# Patient Record
Sex: FEMALE | Race: WHITE | NOT HISPANIC OR LATINO | Employment: UNEMPLOYED | URBAN - METROPOLITAN AREA
[De-identification: names, ages, dates, MRNs, and addresses within clinical notes are randomized per-mention and may not be internally consistent; named-entity substitution may affect disease eponyms.]

---

## 2020-07-12 ENCOUNTER — HOSPITAL ENCOUNTER (INPATIENT)
Facility: OTHER | Age: 30
LOS: 1 days | Discharge: LEFT BEFORE DISCHARGE | DRG: 831 | End: 2020-07-13
Attending: EMERGENCY MEDICINE | Admitting: OBSTETRICS & GYNECOLOGY
Payer: MEDICAID

## 2020-07-12 DIAGNOSIS — A41.9 SEPSIS, DUE TO UNSPECIFIED ORGANISM, UNSPECIFIED WHETHER ACUTE ORGAN DYSFUNCTION PRESENT: ICD-10-CM

## 2020-07-12 DIAGNOSIS — F19.10 POLYSUBSTANCE ABUSE: ICD-10-CM

## 2020-07-12 DIAGNOSIS — O23.00 PYELONEPHRITIS AFFECTING PREGNANCY, ANTEPARTUM: Primary | ICD-10-CM

## 2020-07-12 PROBLEM — O09.30 LIMITED PRENATAL CARE: Status: ACTIVE | Noted: 2020-07-12

## 2020-07-12 PROBLEM — B18.2 CHRONIC HEPATITIS C VIRUS INFECTION: Status: ACTIVE | Noted: 2020-07-12

## 2020-07-12 PROBLEM — Z72.0 TOBACCO USE: Status: ACTIVE | Noted: 2020-07-12

## 2020-07-12 LAB
ALBUMIN SERPL BCP-MCNC: 3.1 G/DL (ref 3.5–5.2)
ALP SERPL-CCNC: 64 U/L (ref 55–135)
ALT SERPL W/O P-5'-P-CCNC: 32 U/L (ref 10–44)
ANION GAP SERPL CALC-SCNC: 14 MMOL/L (ref 8–16)
AST SERPL-CCNC: 23 U/L (ref 10–40)
B-HCG UR QL: POSITIVE
BACTERIA #/AREA URNS HPF: ABNORMAL /HPF
BACTERIA GENITAL QL WET PREP: ABNORMAL
BASOPHILS # BLD AUTO: 0.06 K/UL (ref 0–0.2)
BASOPHILS NFR BLD: 0.4 % (ref 0–1.9)
BILIRUB SERPL-MCNC: 0.5 MG/DL (ref 0.1–1)
BILIRUB UR QL STRIP: NEGATIVE
BUN SERPL-MCNC: 8 MG/DL (ref 6–20)
CALCIUM SERPL-MCNC: 9.2 MG/DL (ref 8.7–10.5)
CHLORIDE SERPL-SCNC: 99 MMOL/L (ref 95–110)
CLARITY UR: ABNORMAL
CLUE CELLS VAG QL WET PREP: ABNORMAL
CO2 SERPL-SCNC: 25 MMOL/L (ref 23–29)
COLOR UR: YELLOW
CREAT SERPL-MCNC: 0.8 MG/DL (ref 0.5–1.4)
CTP QC/QA: YES
DIFFERENTIAL METHOD: ABNORMAL
EOSINOPHIL # BLD AUTO: 0.2 K/UL (ref 0–0.5)
EOSINOPHIL NFR BLD: 1.2 % (ref 0–8)
ERYTHROCYTE [DISTWIDTH] IN BLOOD BY AUTOMATED COUNT: 13.9 % (ref 11.5–14.5)
EST. GFR  (AFRICAN AMERICAN): >60 ML/MIN/1.73 M^2
EST. GFR  (NON AFRICAN AMERICAN): >60 ML/MIN/1.73 M^2
FILAMENT FUNGI VAG WET PREP-#/AREA: ABNORMAL
GLUCOSE SERPL-MCNC: 109 MG/DL (ref 70–110)
GLUCOSE UR QL STRIP: NEGATIVE
HCG INTACT+B SERPL-ACNC: NORMAL MIU/ML
HCT VFR BLD AUTO: 38.3 % (ref 37–48.5)
HGB BLD-MCNC: 13.3 G/DL (ref 12–16)
HGB UR QL STRIP: ABNORMAL
HYALINE CASTS #/AREA URNS LPF: 0 /LPF
IMM GRANULOCYTES # BLD AUTO: 0.08 K/UL (ref 0–0.04)
IMM GRANULOCYTES NFR BLD AUTO: 0.5 % (ref 0–0.5)
KETONES UR QL STRIP: NEGATIVE
LACTATE SERPL-SCNC: 2.1 MMOL/L (ref 0.5–2.2)
LEUKOCYTE ESTERASE UR QL STRIP: ABNORMAL
LYMPHOCYTES # BLD AUTO: 0.6 K/UL (ref 1–4.8)
LYMPHOCYTES NFR BLD: 3.9 % (ref 18–48)
MCH RBC QN AUTO: 31.4 PG (ref 27–31)
MCHC RBC AUTO-ENTMCNC: 34.7 G/DL (ref 32–36)
MCV RBC AUTO: 90 FL (ref 82–98)
MICROSCOPIC COMMENT: ABNORMAL
MONOCYTES # BLD AUTO: 0.2 K/UL (ref 0.3–1)
MONOCYTES NFR BLD: 1.1 % (ref 4–15)
NEUTROPHILS # BLD AUTO: 14.4 K/UL (ref 1.8–7.7)
NEUTROPHILS NFR BLD: 92.9 % (ref 38–73)
NITRITE UR QL STRIP: POSITIVE
NRBC BLD-RTO: 0 /100 WBC
PH UR STRIP: 7 [PH] (ref 5–8)
PLATELET # BLD AUTO: 245 K/UL (ref 150–350)
PMV BLD AUTO: 9 FL (ref 9.2–12.9)
POTASSIUM SERPL-SCNC: 3.7 MMOL/L (ref 3.5–5.1)
PROT SERPL-MCNC: 7 G/DL (ref 6–8.4)
PROT UR QL STRIP: ABNORMAL
RBC # BLD AUTO: 4.24 M/UL (ref 4–5.4)
RBC #/AREA URNS HPF: 50 /HPF (ref 0–4)
SARS-COV-2 RDRP RESP QL NAA+PROBE: NEGATIVE
SODIUM SERPL-SCNC: 138 MMOL/L (ref 136–145)
SP GR UR STRIP: 1.01 (ref 1–1.03)
SPECIMEN SOURCE: ABNORMAL
SQUAMOUS #/AREA URNS HPF: 8 /HPF
T VAGINALIS GENITAL QL WET PREP: ABNORMAL
URN SPEC COLLECT METH UR: ABNORMAL
UROBILINOGEN UR STRIP-ACNC: NEGATIVE EU/DL
WBC # BLD AUTO: 15.51 K/UL (ref 3.9–12.7)
WBC #/AREA URNS HPF: >100 /HPF (ref 0–5)
WBC #/AREA VAG WET PREP: ABNORMAL
WBC CLUMPS URNS QL MICRO: ABNORMAL
YEAST GENITAL QL WET PREP: ABNORMAL

## 2020-07-12 PROCEDURE — 87522 HEPATITIS C REVRS TRNSCRPJ: CPT

## 2020-07-12 PROCEDURE — 96365 THER/PROPH/DIAG IV INF INIT: CPT

## 2020-07-12 PROCEDURE — 99499 UNLISTED E&M SERVICE: CPT | Mod: ,,, | Performed by: OBSTETRICS & GYNECOLOGY

## 2020-07-12 PROCEDURE — 99499 NO LOS: ICD-10-PCS | Mod: ,,, | Performed by: OBSTETRICS & GYNECOLOGY

## 2020-07-12 PROCEDURE — 87086 URINE CULTURE/COLONY COUNT: CPT

## 2020-07-12 PROCEDURE — 36415 COLL VENOUS BLD VENIPUNCTURE: CPT

## 2020-07-12 PROCEDURE — 80053 COMPREHEN METABOLIC PANEL: CPT

## 2020-07-12 PROCEDURE — 87040 BLOOD CULTURE FOR BACTERIA: CPT

## 2020-07-12 PROCEDURE — 83605 ASSAY OF LACTIC ACID: CPT

## 2020-07-12 PROCEDURE — 87088 URINE BACTERIA CULTURE: CPT

## 2020-07-12 PROCEDURE — 81000 URINALYSIS NONAUTO W/SCOPE: CPT

## 2020-07-12 PROCEDURE — 81025 URINE PREGNANCY TEST: CPT | Performed by: PHYSICIAN ASSISTANT

## 2020-07-12 PROCEDURE — 99285 EMERGENCY DEPT VISIT HI MDM: CPT | Mod: 25

## 2020-07-12 PROCEDURE — 84702 CHORIONIC GONADOTROPIN TEST: CPT

## 2020-07-12 PROCEDURE — 87210 SMEAR WET MOUNT SALINE/INK: CPT

## 2020-07-12 PROCEDURE — 96367 TX/PROPH/DG ADDL SEQ IV INF: CPT

## 2020-07-12 PROCEDURE — U0002 COVID-19 LAB TEST NON-CDC: HCPCS

## 2020-07-12 PROCEDURE — 63600175 PHARM REV CODE 636 W HCPCS: Performed by: PHYSICIAN ASSISTANT

## 2020-07-12 PROCEDURE — 87077 CULTURE AEROBIC IDENTIFY: CPT

## 2020-07-12 PROCEDURE — 85025 COMPLETE CBC W/AUTO DIFF WBC: CPT

## 2020-07-12 PROCEDURE — 87491 CHLMYD TRACH DNA AMP PROBE: CPT

## 2020-07-12 PROCEDURE — 11000001 HC ACUTE MED/SURG PRIVATE ROOM

## 2020-07-12 PROCEDURE — 87186 SC STD MICRODIL/AGAR DIL: CPT

## 2020-07-12 PROCEDURE — 86803 HEPATITIS C AB TEST: CPT

## 2020-07-12 PROCEDURE — 25000003 PHARM REV CODE 250: Performed by: STUDENT IN AN ORGANIZED HEALTH CARE EDUCATION/TRAINING PROGRAM

## 2020-07-12 PROCEDURE — 25000003 PHARM REV CODE 250: Performed by: PHYSICIAN ASSISTANT

## 2020-07-12 RX ORDER — DIPHENHYDRAMINE HYDROCHLORIDE 50 MG/ML
25 INJECTION INTRAMUSCULAR; INTRAVENOUS
Status: DISCONTINUED | OUTPATIENT
Start: 2020-07-12 | End: 2020-07-12

## 2020-07-12 RX ORDER — ONDANSETRON 8 MG/1
8 TABLET, ORALLY DISINTEGRATING ORAL EVERY 8 HOURS PRN
Status: DISCONTINUED | OUTPATIENT
Start: 2020-07-12 | End: 2020-07-13 | Stop reason: HOSPADM

## 2020-07-12 RX ORDER — ACETAMINOPHEN 500 MG
1000 TABLET ORAL
Status: COMPLETED | OUTPATIENT
Start: 2020-07-12 | End: 2020-07-12

## 2020-07-12 RX ORDER — AMOXICILLIN 250 MG
1 CAPSULE ORAL NIGHTLY PRN
Status: DISCONTINUED | OUTPATIENT
Start: 2020-07-12 | End: 2020-07-13 | Stop reason: HOSPADM

## 2020-07-12 RX ORDER — ACETAMINOPHEN 325 MG/1
650 TABLET ORAL EVERY 6 HOURS PRN
Status: DISCONTINUED | OUTPATIENT
Start: 2020-07-12 | End: 2020-07-13 | Stop reason: HOSPADM

## 2020-07-12 RX ORDER — SIMETHICONE 80 MG
1 TABLET,CHEWABLE ORAL EVERY 6 HOURS PRN
Status: DISCONTINUED | OUTPATIENT
Start: 2020-07-12 | End: 2020-07-13 | Stop reason: HOSPADM

## 2020-07-12 RX ORDER — DIPHENHYDRAMINE HCL 25 MG
25 CAPSULE ORAL EVERY 4 HOURS PRN
Status: DISCONTINUED | OUTPATIENT
Start: 2020-07-12 | End: 2020-07-13 | Stop reason: HOSPADM

## 2020-07-12 RX ORDER — PRENATAL WITH FERROUS FUM AND FOLIC ACID 3080; 920; 120; 400; 22; 1.84; 3; 20; 10; 1; 12; 200; 27; 25; 2 [IU]/1; [IU]/1; MG/1; [IU]/1; MG/1; MG/1; MG/1; MG/1; MG/1; MG/1; UG/1; MG/1; MG/1; MG/1; MG/1
1 TABLET ORAL DAILY
Status: DISCONTINUED | OUTPATIENT
Start: 2020-07-13 | End: 2020-07-13 | Stop reason: HOSPADM

## 2020-07-12 RX ORDER — SODIUM CHLORIDE 9 MG/ML
INJECTION, SOLUTION INTRAVENOUS CONTINUOUS
Status: DISCONTINUED | OUTPATIENT
Start: 2020-07-12 | End: 2020-07-13 | Stop reason: HOSPADM

## 2020-07-12 RX ORDER — DIPHENHYDRAMINE HYDROCHLORIDE 50 MG/ML
25 INJECTION INTRAMUSCULAR; INTRAVENOUS EVERY 4 HOURS PRN
Status: DISCONTINUED | OUTPATIENT
Start: 2020-07-12 | End: 2020-07-13 | Stop reason: HOSPADM

## 2020-07-12 RX ADMIN — ACETAMINOPHEN 1000 MG: 500 TABLET ORAL at 04:07

## 2020-07-12 RX ADMIN — SODIUM CHLORIDE: 0.9 INJECTION, SOLUTION INTRAVENOUS at 10:07

## 2020-07-12 RX ADMIN — SODIUM CHLORIDE 1000 ML: 0.9 INJECTION, SOLUTION INTRAVENOUS at 04:07

## 2020-07-12 RX ADMIN — PROMETHAZINE HYDROCHLORIDE 12.5 MG: 25 INJECTION INTRAMUSCULAR; INTRAVENOUS at 04:07

## 2020-07-12 RX ADMIN — CEFTRIAXONE 1 G: 1 INJECTION, SOLUTION INTRAVENOUS at 05:07

## 2020-07-12 RX ADMIN — SODIUM CHLORIDE 1178 ML: 0.9 INJECTION, SOLUTION INTRAVENOUS at 06:07

## 2020-07-12 NOTE — ED PROVIDER NOTES
Encounter Date: 7/12/2020       History     Chief Complaint   Patient presents with    Chest Pain     began 30 minutes ago more concentrated to the R upper anterior chest. + back and side pain. Pt states she is 4 months pregnant     Febrile 29-year-old female with past medical history of hepatitis-C, polysubstance abuse with last methamphetamine and heroin use 3 days ago presents to the ED for evaluation of generalized body aches, abdominal pain and fever.  She states symptoms began few days ago.  She does state that initially she had just left lower quadrant abdominal pain however this pain has now radiated to the left flank and right sided abdominal pain and flank.  She also reports associated generalized body aches and chills.  She does state that she was seen at another facility where they did a bedside ultrasound and confirmed IUP with estimated gestation at 12-13 weeks with fetal heart tones reported by ED physician at that time.  She states that she has had no prenatal care due to lack of insurance.  She does report some nausea however states that this has been occurring throughout pregnancy and denies any change in this.  She denies any cough, URI symptoms, hemoptysis, shortness of breath or chest pain at this time.  She denies any vaginal discharge, dysuria, hematuria or vaginal bleeding.  She has not tried any medications for symptoms.    The history is provided by the patient.     Review of patient's allergies indicates:  No Known Allergies  History reviewed. No pertinent past medical history.  No past surgical history on file.  History reviewed. No pertinent family history.  Social History     Tobacco Use    Smoking status: Not on file   Substance Use Topics    Alcohol use: Not on file    Drug use: Not on file     Review of Systems   Constitutional: Positive for chills and fever (Subjective).   HENT: Negative for sore throat.    Respiratory: Negative for cough and shortness of breath.     Cardiovascular: Negative for chest pain.   Gastrointestinal: Positive for abdominal pain and nausea. Negative for constipation, diarrhea and vomiting.   Genitourinary: Positive for flank pain. Negative for dysuria, hematuria, vaginal bleeding and vaginal discharge.   Musculoskeletal: Positive for arthralgias and back pain. Negative for neck pain and neck stiffness.   Skin: Negative for rash.   Allergic/Immunologic: Positive for immunocompromised state.   Neurological: Negative for weakness, light-headedness and headaches.   Hematological: Does not bruise/bleed easily.   Psychiatric/Behavioral: Negative for confusion.       Physical Exam     Initial Vitals [07/12/20 1533]   BP Pulse Resp Temp SpO2   125/74 (!) 131 (!) 24 (!) 101.4 °F (38.6 °C) --      MAP       --         Vitals:    07/12/20 1634   BP:    Pulse:    Resp:    Temp: (!) 101 °F (38.3 °C)       Physical Exam    Nursing note and vitals reviewed.  Constitutional: She appears well-developed and well-nourished. She is cooperative. She appears ill. She appears distressed.   HENT:   Head: Normocephalic and atraumatic.   Mouth/Throat: Mucous membranes are dry.   Eyes: Conjunctivae and lids are normal.   Neck: Neck supple. No neck rigidity.   Cardiovascular: Regular rhythm. Tachycardia present.    Pulmonary/Chest: Breath sounds normal. No respiratory distress. She has no wheezes. She has no rhonchi.   Abdominal: Soft. Normal appearance and bowel sounds are normal. There is no abdominal tenderness. There is CVA tenderness. There is no rigidity and no guarding.   Generalized tenderness however no rebound, rigidity or guarding; bilateral CVA tenderness   Musculoskeletal: Normal range of motion.   Neurological: She is alert and oriented to person, place, and time. GCS eye subscore is 4. GCS verbal subscore is 5. GCS motor subscore is 6.   Skin: Skin is warm, dry and intact. No rash noted.   Psychiatric: She has a normal mood and affect. Her speech is normal and  behavior is normal. Thought content normal.         ED Course   Procedures  Labs Reviewed   CBC W/ AUTO DIFFERENTIAL - Abnormal; Notable for the following components:       Result Value    WBC 15.51 (*)     Mean Corpuscular Hemoglobin 31.4 (*)     MPV 9.0 (*)     Gran # (ANC) 14.4 (*)     Immature Grans (Abs) 0.08 (*)     Lymph # 0.6 (*)     Mono # 0.2 (*)     Gran% 92.9 (*)     Lymph% 3.9 (*)     Mono% 1.1 (*)     All other components within normal limits   COMPREHENSIVE METABOLIC PANEL - Abnormal; Notable for the following components:    Albumin 3.1 (*)     All other components within normal limits   URINALYSIS, REFLEX TO URINE CULTURE - Abnormal; Notable for the following components:    Appearance, UA Hazy (*)     Protein, UA 1+ (*)     Occult Blood UA 2+ (*)     Nitrite, UA Positive (*)     Leukocytes, UA 2+ (*)     All other components within normal limits    Narrative:     Specimen Source->Urine   POCT URINE PREGNANCY - Abnormal; Notable for the following components:    POC Preg Test, Ur Positive (*)     All other components within normal limits   CULTURE, BLOOD   CULTURE, BLOOD   LACTIC ACID, PLASMA   URINALYSIS MICROSCOPIC    Narrative:     Specimen Source->Urine   SARS-COV-2 RNA AMPLIFICATION, QUAL          Imaging Results          US OB <14 Wks TransAbd & TransVag, Single Gestation (XPD) (In process)                X-Ray Chest AP Portable (Final result)  Result time 07/12/20 18:08:46    Final result by Alma Mitchell MD (07/12/20 18:08:46)                 Impression:      No acute intrathoracic abnormality detected.      Electronically signed by: Alma Mitchell  Date:    07/12/2020  Time:    18:08             Narrative:    EXAMINATION:  AP PORTABLE CHEST    CLINICAL HISTORY:  cough;    TECHNIQUE:  AP portable chest radiograph was submitted.    COMPARISON:  None.    FINDINGS:  AP portable chest radiograph demonstrates a cardiac silhouette within normal limits.  There is no focal consolidation,  pneumothorax, or pleural effusion.                                 Medical Decision Making:   Initial Assessment:   29-year-old female with pregnancy of estimated 10-12 weeks gestation with no prior prenatal care presents to the ED for evaluation of generalized abdominal pain that radiates to bilateral flank.  She also reports subjective fever, chills and generalized body aches.  She appears ill.  Mucous membranes are noted to be dry.  Lungs CTA; heart rate tachycardia however normal rhythm.  Generalized tenderness to palpation of the abdomen however no rebound, rigidity or guarding; bilateral CVA tenderness.  Skin free of rash, pallor and diaphoresis.   Differential Diagnosis:   Differential Diagnosis includes, but is not limited to:  Sepsis, bacteremia, UTI, pneumonia, cellulitis, abscess, indwelling line/catheter infection, cholecystitis, viral URI, gastroenteritis, viral syndrome, sinusitis, otitis media/externa, neoplasm, drug reaction, serotonin syndrome, intoxication/withdrawal syndrome.    Clinical Tests:   Lab Tests: Ordered and Reviewed  Radiological Study: Ordered and Reviewed  Medical Tests: Ordered and Reviewed  ED Management:  Patient noted to be febrile and tachycardic with increased respiratory rate.  Given these findings high concern for SIRS.  Labs including blood culture, lactic and COVID swab were obtained.  Labs notable for leukocytosis at 15 with a left shift.  UA does reveal nitrite positive UTI.  Rocephin was ordered as this is likely the etiology of infectious process.  Remaining 30 make per kg bolus was ordered.  Lactic is normal.  Remaining labs grossly unremarkable.  As she told triage some right-sided chest pain a chest x-ray was obtained however she does not endorse this to myself.  Did discuss findings with OB he will come evaluate the patient in the ED.  They did complete a pelvic exam with no reported abnormalities at this time.  They did recommend a formal ultrasound as she has not  had a dated ultrasound thus far.  I will add HCG as well and her serial lactic.  Patient does continue with some tachycardia and fever however given she is in her 1st trimester we will reassess after additional IV fluids; she continues with no hypotension.  Ob did come evaluate the patient in the ED and recommend admission for further IV hydration and evaluation.  Patient is agreeable to plan.  She is stable at time of admission.                   ED Course as of Jul 12 1806   Sun Jul 12, 2020 1802 EKG reveals sinus tachycardia at 1:23 a.m.; no STEM, no ectopy, no axial deviation    [LC]      ED Course User Index  [LC] WANDER Vargas                Clinical Impression:       ICD-10-CM ICD-9-CM   1. Pyelonephritis affecting pregnancy, antepartum  O23.00 646.63     590.80   2. Polysubstance abuse  F19.10 305.90   3. Sepsis, due to unspecified organism, unspecified whether acute organ dysfunction present  A41.9 038.9     995.91                                WANDER Vargas  07/12/20 1925

## 2020-07-12 NOTE — ED TRIAGE NOTES
Pt susana ed c/o generalized body pain. Pt states she is pregnant and not knowing how far along she was. She denies any SOB but admits to chest discomfort. She states also experiencing subject miscarriage but denies any spotting. Pt AAOx4, resp pattern even and non labored.

## 2020-07-12 NOTE — SUBJECTIVE & OBJECTIVE
OB History   No obstetric history on file.     History reviewed. No pertinent past medical history.  No past surgical history on file.    (Not in a hospital admission)      Review of patient's allergies indicates:  No Known Allergies     Family History     None        Tobacco Use    Smoking status: Not on file   Substance and Sexual Activity    Alcohol use: Not on file    Drug use: Not on file    Sexual activity: Not on file     Review of Systems   Constitutional: Positive for chills and fever. Negative for fatigue.   HENT: Negative for nasal congestion.    Eyes: Negative for visual disturbance.   Respiratory: Negative for cough and shortness of breath.    Cardiovascular: Negative for chest pain and leg swelling.   Gastrointestinal: Positive for abdominal pain. Negative for constipation, diarrhea, nausea and vomiting.   Endocrine: Negative for hot flashes.   Genitourinary: Positive for flank pain and frequency. Negative for dysuria, pelvic pain, vaginal bleeding and vaginal discharge.   Musculoskeletal: Positive for back pain.   Integumentary:  Negative for rash, breast skin changes and breast tenderness.   Neurological: Negative for headaches.   Psychiatric/Behavioral: Negative for depression.   Breast: Negative for skin changes and tenderness     Objective:     Vital Signs (Most Recent):  Temp: (!) 100.5 °F (38.1 °C) (07/12/20 1816)  Pulse: (!) 125 (07/12/20 1741)  Resp: (!) 24 (07/12/20 1533)  BP: (!) 113/59 (07/12/20 1739)  SpO2: 97 % (07/12/20 1739) Vital Signs (24h Range):  Temp:  [100.5 °F (38.1 °C)-101.4 °F (38.6 °C)] 100.5 °F (38.1 °C)  Pulse:  [125-131] 125  Resp:  [24] 24  SpO2:  [97 %] 97 %  BP: (113-125)/(59-74) 113/59     Weight: 72.6 kg (160 lb)  Body mass index is 25.82 kg/m².    No LMP recorded.    Physical Exam:   Constitutional: She is oriented to person, place, and time. She appears well-developed and well-nourished. No distress.    HENT:   Head: Normocephalic and atraumatic.     Neck:  Normal range of motion.    Cardiovascular: Normal rate.     Pulmonary/Chest: Effort normal. No respiratory distress.        Abdominal: Soft. Bowel sounds are normal. She exhibits no distension, no mass and no abdominal incision. There is abdominal tenderness ( TTP throughout lower abdomen, no rebound or guarding). No hernia.     Genitourinary:    Vagina and uterus normal.   Uterus is not enlarged and not tender. Cervix is normal. Right adnexum displays no tenderness and no fullness. Left adnexum displays no tenderness and no fullness.    Genitourinary Comments: Uterus approximately 10 week size, no CMT  SSE reveals no discharge, closed cervical os.   Normal urethra/ urethral meatus  Normal external genitalia   negative for vaginal discharge          Musculoskeletal: Normal range of motion. No tenderness or edema.       Neurological: She is alert and oriented to person, place, and time.    Skin: Skin is warm and dry.    Psychiatric: She has a normal mood and affect.       Laboratory:  CBC:   Recent Labs   Lab 07/12/20  1639   WBC 15.51*   RBC 4.24   HGB 13.3   HCT 38.3      MCV 90   MCH 31.4*   MCHC 34.7     CMP:   Recent Labs   Lab 07/12/20  1639      CALCIUM 9.2   ALBUMIN 3.1*   PROT 7.0      K 3.7   CO2 25   CL 99   BUN 8   CREATININE 0.8   ALKPHOS 64   ALT 32   AST 23   BILITOT 0.5     Recent Labs   Lab 07/12/20  1652   COLORU Yellow   SPECGRAV 1.015   PHUR 7.0   PROTEINUA 1+*   BACTERIA Many*   NITRITE Positive*   LEUKOCYTESUR 2+*   UROBILINOGEN Negative   HYALINECASTS 0     Lactate 2.1    Urine culture- pending    Blood culture- pending    Diagnostic Results:    CXR- negative    TVUS- pending

## 2020-07-12 NOTE — HPI
Mackenzie Montalvo is a 29 y.o.  at approximately 12-13weeks gestation based on ED bedside U/S with no PNC due to lack of insurance and pmh significant for hepatitis-C, polysubstance abuse with last methamphetamine and heroin use 3 days ago presents to the ED for evaluation of generalized body aches, abdominal pain and fever. She also endorses nausea, but no emesis. She states that the pain started a few days ago, and was localized in the LLQ, but now radiates to the L flank and R side. She reports body aches and chills. She does state that she was seen at another facility where they did a bedside ultrasound and confirmed IUP with estimated gestation at 12-13 weeks with fetal heart tones reported by ED physician at that time. She denies any cough, URI symptoms, hemoptysis, shortness of breath or chest pain at this time. She denies any vaginal discharge, dysuria, hematuria or vaginal bleeding.  She has not tried any medications for symptoms.    She is a current every day smoker, smokes approximately 0.5-1ppd. She denies any alcohol use. She endorses IVDU, most often methamphetamine and most recent use was approximately 3 days ago, before she found out she was pregnant. She also endorses heroine use. She denies ever using methadone or subutex.    She is living with her sister at this time, but is from [place] and is working to set up insurance both in [place] and in [place] because she is not sure where she will be living long term. She has 1 sexual partner, her boyfriend of the last 3 months, but she also endorses that she is uncertain the paternity of this baby. She states that she feels safe at home.

## 2020-07-13 ENCOUNTER — TELEPHONE (OUTPATIENT)
Dept: OBSTETRICS AND GYNECOLOGY | Facility: HOSPITAL | Age: 30
End: 2020-07-13

## 2020-07-13 VITALS
HEART RATE: 112 BPM | TEMPERATURE: 99 F | RESPIRATION RATE: 18 BRPM | WEIGHT: 160 LBS | DIASTOLIC BLOOD PRESSURE: 61 MMHG | BODY MASS INDEX: 25.71 KG/M2 | SYSTOLIC BLOOD PRESSURE: 113 MMHG | OXYGEN SATURATION: 100 % | HEIGHT: 66 IN

## 2020-07-13 DIAGNOSIS — O23.00 PYELONEPHRITIS AFFECTING PREGNANCY, ANTEPARTUM: Primary | ICD-10-CM

## 2020-07-13 LAB
ABO + RH BLD: NORMAL
BASOPHILS # BLD AUTO: 0.05 K/UL (ref 0–0.2)
BASOPHILS NFR BLD: 0.3 % (ref 0–1.9)
BLD GP AB SCN CELLS X3 SERPL QL: NORMAL
DIFFERENTIAL METHOD: ABNORMAL
EOSINOPHIL # BLD AUTO: 0.2 K/UL (ref 0–0.5)
EOSINOPHIL NFR BLD: 1.1 % (ref 0–8)
ERYTHROCYTE [DISTWIDTH] IN BLOOD BY AUTOMATED COUNT: 14.1 % (ref 11.5–14.5)
HBV SURFACE AG SERPL QL IA: NEGATIVE
HCT VFR BLD AUTO: 34.3 % (ref 37–48.5)
HCV AB SERPL QL IA: POSITIVE
HGB BLD-MCNC: 11.5 G/DL (ref 12–16)
HIV 1+2 AB+HIV1 P24 AG SERPL QL IA: NEGATIVE
IMM GRANULOCYTES # BLD AUTO: 0.07 K/UL (ref 0–0.04)
IMM GRANULOCYTES NFR BLD AUTO: 0.4 % (ref 0–0.5)
LACTATE SERPL-SCNC: 0.9 MMOL/L (ref 0.5–2.2)
LYMPHOCYTES # BLD AUTO: 1.1 K/UL (ref 1–4.8)
LYMPHOCYTES NFR BLD: 6.6 % (ref 18–48)
MCH RBC QN AUTO: 30.6 PG (ref 27–31)
MCHC RBC AUTO-ENTMCNC: 33.5 G/DL (ref 32–36)
MCV RBC AUTO: 91 FL (ref 82–98)
MONOCYTES # BLD AUTO: 1.2 K/UL (ref 0.3–1)
MONOCYTES NFR BLD: 6.9 % (ref 4–15)
NEUTROPHILS # BLD AUTO: 14.3 K/UL (ref 1.8–7.7)
NEUTROPHILS NFR BLD: 84.7 % (ref 38–73)
NRBC BLD-RTO: 0 /100 WBC
PLATELET # BLD AUTO: 230 K/UL (ref 150–350)
PMV BLD AUTO: 9.3 FL (ref 9.2–12.9)
RBC # BLD AUTO: 3.76 M/UL (ref 4–5.4)
RPR SER QL: NORMAL
WBC # BLD AUTO: 16.9 K/UL (ref 3.9–12.7)

## 2020-07-13 PROCEDURE — 86850 RBC ANTIBODY SCREEN: CPT

## 2020-07-13 PROCEDURE — 83605 ASSAY OF LACTIC ACID: CPT

## 2020-07-13 PROCEDURE — 86703 HIV-1/HIV-2 1 RESULT ANTBDY: CPT

## 2020-07-13 PROCEDURE — 85025 COMPLETE CBC W/AUTO DIFF WBC: CPT

## 2020-07-13 PROCEDURE — 86592 SYPHILIS TEST NON-TREP QUAL: CPT

## 2020-07-13 PROCEDURE — 25000003 PHARM REV CODE 250: Performed by: STUDENT IN AN ORGANIZED HEALTH CARE EDUCATION/TRAINING PROGRAM

## 2020-07-13 PROCEDURE — 86762 RUBELLA ANTIBODY: CPT

## 2020-07-13 PROCEDURE — 36415 COLL VENOUS BLD VENIPUNCTURE: CPT

## 2020-07-13 PROCEDURE — 87340 HEPATITIS B SURFACE AG IA: CPT

## 2020-07-13 RX ORDER — SULFAMETHOXAZOLE AND TRIMETHOPRIM 800; 160 MG/1; MG/1
1 TABLET ORAL 2 TIMES DAILY
Qty: 14 TABLET | Refills: 0 | Status: SHIPPED | OUTPATIENT
Start: 2020-07-13 | End: 2020-07-20

## 2020-07-13 RX ADMIN — SODIUM CHLORIDE: 0.9 INJECTION, SOLUTION INTRAVENOUS at 05:07

## 2020-07-13 NOTE — PLAN OF CARE
Problem: Adult Inpatient Plan of Care  Goal: Plan of Care Review  Outcome: Ongoing, Progressing  Flowsheets (Taken 2020 0523)  Plan of Care Reviewed With: patient  Goal: Readiness for Transition of Care  Outcome: Ongoing, Progressing     Problem:  Fall Injury Risk  Goal: Absence of Fall, Infant Drop and Related Injury  Outcome: Ongoing, Progressing     Pt AAOx4.  C/o pain back pain. Ambulates with steady gait.  Continuous IV fluids to left hand.  Skin intact.  Refused 1st set of labs once she arrived on the floor; agreed to get the labs drawn later at 3:50a,m.  Safety reinforced; no injuries during shift. Vital signs stable.  Will continue to monitor.

## 2020-07-13 NOTE — ASSESSMENT & PLAN NOTE
- Patient known IVDU, current user of methamphetamine and also endorses using heroine, last use 7/9  - Has never been on any form of methadone or subutex  - No current s/s of withdrawal  - May order telepsych consult due to drug abuse and need for possible addiction medicine follow up  - Social work consult placed

## 2020-07-13 NOTE — TELEPHONE ENCOUNTER
Pt left AMA per nurse. IV was taken out prior to her departure. Patient was admitted for pyelonephritis and continues to need IV antibiotics. Called patient, with no answer, and left messages encouraging her to return to the hospital for medical care and IV antibiotics. If unable to come in, to please call back and allow us to send in a prescription for oral antibiotics. Stated she would need close follow up with an OB provider during her pregnancy.     Patient has three contacts listed in her demographics: herself, Sigifredo Barbosa and Elizabeth Barnes. The patient two phone numbers (home and mobile) were the same number and same number listed under Elizabeth. This number was called and left message.  The number for sigifredo barbosa was also called and no answer and voicemail set up.     Will continue to try to contact patient.    Will send message to [facility] for follow up appointment to be scheduled to see if they can also get in contact with her.    Sent a prescription to Bactrim to [facility]. Told patient on voicemail we would do this if she is able to pick them up. She didn't have another pharmacy listed.     Kris Cordova MD  OBGYN PGY-2

## 2020-07-13 NOTE — PROGRESS NOTES
[facility]  Obstetrics  Antepartum Progress Note    Patient Name: Mackenzie Montalvo  MRN: [id number]  Admission Date: 2020  Hospital Length of Stay: 1 days  Attending Physician: No att. providers found  Primary Care Provider: Primary Doctor No    Subjective:     Principal Problem:Pyelonephritis affecting pregnancy, antepartum    HPI:  Mackenzie Montalvo is a 29 y.o.  at approximately 12-13weeks gestation based on ED bedside U/S with no PNC due to lack of insurance and pmh significant for hepatitis-C, polysubstance abuse with last methamphetamine and heroin use 3 days ago presents to the ED for evaluation of generalized body aches, abdominal pain and fever. She also endorses nausea, but no emesis. She states that the pain started a few days ago, and was localized in the LLQ, but now radiates to the L flank and R side. She reports body aches and chills. She does state that she was seen at another facility where they did a bedside ultrasound and confirmed IUP with estimated gestation at 12-13 weeks with fetal heart tones reported by ED physician at that time. She denies any cough, URI symptoms, hemoptysis, shortness of breath or chest pain at this time. She denies any vaginal discharge, dysuria, hematuria or vaginal bleeding.  She has not tried any medications for symptoms.    She is a current every day smoker, smokes approximately 0.5-1ppd. She denies any alcohol use. She endorses IVDU, most often methamphetamine and most recent use was approximately 3 days ago, before she found out she was pregnant. She also endorses heroine use. She denies ever using methadone or subutex.    She is living with her sister at this time, but is from [place] and is working to set up insurance both in [place] and in [place] because she is not sure where she will be living long term. She has 1 sexual partner, her boyfriend of the last 3 months, but she also endorses that she is uncertain the paternity of this baby. She states that  she feels safe at home.     Hospital Course:  2020 Patient with continued complaint of right flank pain radiating to groin. Denies dysuria, hematuria, fever, chills. Reports chronic constipation, last BM yesterday, no flatus since admit. No signs or symptoms of withdrawal, reports last use of heroine and methamphetamines as 4 days ago. 1st trimester labs, HCV quant, urine culture, blood culture x2 pending. WBC 15>17. Rocephin started on .         OB History    Para Term  AB Living   1 0 0 0 0 0   SAB TAB Ectopic Multiple Live Births   0 0 0 0 0      # Outcome Date GA Lbr Alan/2nd Weight Sex Delivery Anes PTL Lv   1 Current              History reviewed. No pertinent past medical history.  No past surgical history on file.    No medications prior to admission.       Review of patient's allergies indicates:  No Known Allergies     Family History     None        Tobacco Use    Smoking status: Never Smoker    Smokeless tobacco: Never Used   Substance and Sexual Activity    Alcohol use: Not Currently    Drug use: Yes     Types: IV    Sexual activity: Yes     Partners: Male     Review of Systems   Constitutional: Negative for chills, fatigue and fever.   HENT: Negative for nasal congestion.    Eyes: Negative for visual disturbance.   Respiratory: Negative for cough and shortness of breath.    Cardiovascular: Negative for chest pain and leg swelling.   Gastrointestinal: Positive for abdominal pain and constipation. Negative for diarrhea, nausea and vomiting.   Endocrine: Negative for hot flashes.   Genitourinary: Positive for flank pain. Negative for dysuria, frequency, pelvic pain, vaginal bleeding and vaginal discharge.   Musculoskeletal: Positive for back pain.   Integumentary:  Negative for rash, breast skin changes and breast tenderness.   Neurological: Negative for headaches.   Psychiatric/Behavioral: Negative for depression.   Breast: Negative for skin changes and tenderness      Objective:     Vital Signs (Most Recent):  Temp: 98.7 °F (37.1 °C) (07/13/20 0442)  Pulse: (!) 112 (07/13/20 0442)  Resp: 18 (07/13/20 0442)  BP: 113/61 (07/13/20 0442)  SpO2: 100 % (07/13/20 0442) Vital Signs (24h Range):  Temp:  [97.5 °F (36.4 °C)-101.4 °F (38.6 °C)] 98.7 °F (37.1 °C)  Pulse:  [107-131] 112  Resp:  [17-24] 18  SpO2:  [96 %-100 %] 100 %  BP: ()/(55-74) 113/61     Weight: 72.6 kg (160 lb)  Body mass index is 25.82 kg/m².    No LMP recorded (lmp unknown). Patient is pregnant.    Physical Exam:   Constitutional: She is oriented to person, place, and time. She appears well-developed and well-nourished. No distress.    HENT:   Head: Normocephalic and atraumatic.     Neck: Normal range of motion.    Cardiovascular: Normal rate.     Pulmonary/Chest: Effort normal. No respiratory distress.        Abdominal: Soft. Bowel sounds are normal. She exhibits no distension, no mass and no abdominal incision. There is abdominal tenderness ( TTP throughout lower abdomen, no rebound or guarding). There is no rebound and no guarding. No hernia.     Genitourinary:    Genitourinary Comments: CVA tenderness R>L             Musculoskeletal: Normal range of motion. No tenderness or edema.       Neurological: She is alert and oriented to person, place, and time.    Skin: Skin is warm and dry.    Psychiatric: She has a normal mood and affect.       Laboratory:  CBC:   Recent Labs   Lab 07/13/20  0412   WBC 16.90*   RBC 3.76*   HGB 11.5*   HCT 34.3*      MCV 91   MCH 30.6   MCHC 33.5     CMP:   Recent Labs   Lab 07/12/20  1639      CALCIUM 9.2   ALBUMIN 3.1*   PROT 7.0      K 3.7   CO2 25   CL 99   BUN 8   CREATININE 0.8   ALKPHOS 64   ALT 32   AST 23   BILITOT 0.5     Recent Labs   Lab 07/12/20  1652   COLORU Yellow   SPECGRAV 1.015   PHUR 7.0   PROTEINUA 1+*   BACTERIA Many*   NITRITE Positive*   LEUKOCYTESUR 2+*   UROBILINOGEN Negative   HYALINECASTS 0     Lactate 2.1    Urine culture -  pending    Blood culture - pending, no growth to date x1 day    Diagnostic Results:    CXR - negative    TVUS - Single living intrauterine gestation, crown-rump length correlates with an estimated gestational age of 13 weeks 0 days, cardiac activity documented at 172 beats per minute.  There is thin fluid in the cervical canal.    Assessment/Plan:     29 y.o. female  at Unknown for:    * Pyelonephritis affecting pregnancy, antepartum  - VS afebrile since 1816, tachycardic to 110s, normotensive, O2 sats wnl  - COVID negative  - PE with + CVA tenderness, R>L  - Labs:        CBC: WBC 15>16.9 H/H 11.5/34.3 Platelets 230       CMP wnl AST/ALT 23/32       Lactate 2.1       UA: + Nitrite, 2+ leukocyte, 1+ protein, + blood       Urine Cx : pending       Blood Cx : pending, NGTD x1 day       GCCT and vaginosis screen: pending    - s/p 2L NS in ED, and 1g tylenol  - IV Rocephin 1g/50mL q24 hours   - PRN phenergan/zofran for nausea  - Cont to monitor temp and pain closely      Chronic hepatitis C virus infection  - Patient states that she was diagnosed 2 years ago, and has not followed up with a primary doctor for this since  - uncertain whether she received treatment for this  - HCV ab and PCR drawn on admission, pending    Tobacco use  - patient is a current every day smoker  - denies wanting to quit at this time and denies tobacco patch    IV drug abuse  - Patient known IVDU, current user of methamphetamine and also endorses using heroine, last use   - Has never been on any form of methadone or subutex  - No current s/s of withdrawal  - May order telepsych consult due to drug abuse and need for possible addiction medicine follow up  - Social work consult placed    Limited prenatal care  - TVUS with single living intrauterine gestation at 13w1d  - Patient is from [place], and is attempting to file for insurance. Living with sister currently, and uncertain whether she will be staying in [place] or going back to [place]  for long term PNC.   - 1st trimester labs pending  - [facility]          Bridgett Cordova MD  Obstetrics  [facility]

## 2020-07-13 NOTE — ASSESSMENT & PLAN NOTE
- patient is a current every day smoker  - denies wanting to quit at this time and denies tobacco patch

## 2020-07-13 NOTE — ASSESSMENT & PLAN NOTE
- Patient known IVDU- current user of methamphetamine and also endorses using heroine  - Has never been on any form of methadone or subutex  - no current s/s of withdrawal  - May order telepsych consult due to drug abuse and need for possible addiction medicine follow up

## 2020-07-13 NOTE — PLAN OF CARE
0750  Patient found in [place] with belongings and dressed, intercepted per Charge RNADILENE. Statied she is leaving to go to the car get her shoes, and then states to smoke.  Policy reviewed with patient and visitor.  Patient requests AMA papers. IV removed Security called and AMA papers received.     Patient and visitor escorted out by Security s IV access c all belgings    Charge Notified Resident on call, MD Dr Yahaira Owen, Rounding Attending Notified per RN

## 2020-07-13 NOTE — ASSESSMENT & PLAN NOTE
- pt states that she was diagnosed 2 years ago, and has not followed up with a primary doctor for this since  - uncertain whether she received treatment for this  - hep C ab and PCR drawn on admission

## 2020-07-13 NOTE — ASSESSMENT & PLAN NOTE
- Patient with pregnancy of unknown gestational age- dated approximately 12-13 weeks 3 days ago with bedside ED ultrasound. Patient is from [place], and is attempting to file for insurance. Living with sister currently, and uncertain whether she will be staying in [place] or going back to [place] for long term PNC.   - TVUS ordered from ED for official dating.   - Will order PNV  - [facility] to establish PNC.

## 2020-07-13 NOTE — DISCHARGE SUMMARY
[facility]  Obstetrics  Discharge Summary      Patient Name: Mackenzie Montalvo  MRN: [id number]  Admission Date: 2020  Hospital Length of Stay: 1 days  Discharge Date and Time:  2020 10:44 AM  Attending Physician: No att. providers found   Discharging Provider: Bridgett Cordova MD   Primary Care Provider: Primary Doctor Bela    HPI: Mackenzie Montalvo is a 29 y.o.  at approximately 12-13weeks gestation based on ED bedside U/S with no PNC due to lack of insurance and pmh significant for hepatitis-C, polysubstance abuse with last methamphetamine and heroin use 3 days ago presents to the ED for evaluation of generalized body aches, abdominal pain and fever. She also endorses nausea, but no emesis. She states that the pain started a few days ago, and was localized in the LLQ, but now radiates to the L flank and R side. She reports body aches and chills. She does state that she was seen at another facility where they did a bedside ultrasound and confirmed IUP with estimated gestation at 12-13 weeks with fetal heart tones reported by ED physician at that time. She denies any cough, URI symptoms, hemoptysis, shortness of breath or chest pain at this time. She denies any vaginal discharge, dysuria, hematuria or vaginal bleeding.  She has not tried any medications for symptoms.    She is a current every day smoker, smokes approximately 0.5-1ppd. She denies any alcohol use. She endorses IVDU, most often methamphetamine and most recent use was approximately 3 days ago, before she found out she was pregnant. She also endorses heroine use. She denies ever using methadone or subutex.    She is living with her sister at this time, but is from [place] and is working to set up insurance both in [place] and in [place] because she is not sure where she will be living long term. She has 1 sexual partner, her boyfriend of the last 3 months, but she also endorses that she is uncertain the paternity of this baby. She states  that she feels safe at home.     Hospital Course:   07/13/2020 Patient with continued complaint of right flank pain radiating to groin. Denies dysuria, hematuria, fever, chills. Reports chronic constipation, last BM yesterday, no flatus since admit. No signs or symptoms of withdrawal, reports last use of heroine and methamphetamines as 4 days ago. 1st trimester labs, HCV quant, urine culture, blood culture x2 pending. WBC 15>17. Rocephin started on 7/12.          Final Active Diagnoses:    Diagnosis Date Noted POA    PRINCIPAL PROBLEM:  Pyelonephritis affecting pregnancy, antepartum [O23.00] 07/12/2020 Yes    Limited prenatal care [O09.30] 07/12/2020 Not Applicable    IV drug abuse [F19.10] 07/12/2020 Unknown    Tobacco use [Z72.0] 07/12/2020 Unknown    Chronic hepatitis C virus infection [B18.2] 07/12/2020 Unknown      Problems Resolved During this Admission:        Significant Diagnostic Studies: Labs:   BMP:   Recent Labs   Lab 07/12/20  1639         K 3.7   CL 99   CO2 25   BUN 8   CREATININE 0.8   CALCIUM 9.2   , CBC   Recent Labs   Lab 07/12/20  1639 07/13/20  0412   WBC 15.51* 16.90*   HGB 13.3 11.5*   HCT 38.3 34.3*    230   , All labs within the past 24 hours have been reviewed.  Radiology: Ultrasound: Single living intrauterine gestation, crown-rump length correlates with an estimated gestational age of 13 weeks 0 days, cardiac activity documented at 172 beats per minute.  There is thin fluid in the cervical canal.    Immunizations     None          This patient has no babies on file.  Pending Diagnostic Studies:     Procedure Component Value Units Date/Time    HIV 1/2 Ag/Ab (4th Gen) [[id number]] Collected: 07/13/20 0412    Order Status: Sent Lab Status: In process Updated: 07/13/20 0939    Specimen: Blood     Hepatitis B surface antigen [[id number]] Collected: 07/13/20 0413    Order Status: Sent Lab Status: In process Updated: 07/13/20 0939    Specimen: Blood     Hepatitis C RNA,  quantitative, PCR [[id number]] Collected: 07/12/20 1940    Order Status: Sent Lab Status: In process Updated: 07/13/20 0819    Specimen: Blood     Narrative:      Collection has been rescheduled by SACHIN at 07/12/2020 19:43 Reason:   Duplicate order per nurse Stefan. JOSHJoni    RPR [[id number]] Collected: 07/13/20 0412    Order Status: Sent Lab Status: In process Updated: 07/13/20 0536    Specimen: Blood     Rubella antibody, IgG [[id number]] Collected: 07/13/20 0412    Order Status: Sent Lab Status: In process Updated: 07/13/20 0939    Specimen: Blood           Discharged Condition: against medical advice    Disposition: Home or Self Care    Follow Up:    Patient Instructions:   No discharge procedures on file. as patient left AMA and we were unable to see her prior to her departure.  Medications:  There are no discharge medications for this patient.  No medications were ordered prior to discharge. Patient was called after we learned she had left AMA and asked to return for inpatient treatment for her pyelonephritis with IV abx. If patient unable to return, and no pharmacy on her records, a prescription for PO abx was sent to [facility] in case patient listens to voicemail and comes to  the medication.    Kris Cordova MD  OBGYN PGY-2

## 2020-07-13 NOTE — ASSESSMENT & PLAN NOTE
- TVUS with single living intrauterine gestation at 13w1d  - [place] is from [place], and is attempting to file for insurance. Living with sister currently, and uncertain whether she will be staying in [place] or going back to [place] for long term PNC.   - 1st trimester labs pending  - [facility]

## 2020-07-13 NOTE — PLAN OF CARE
Pt left AMA prior to initial or final discharge planning assessment completed.   07/13/20 0951   Final Note   Assessment Type Final Discharge Note

## 2020-07-13 NOTE — ASSESSMENT & PLAN NOTE
- Patient states that she was diagnosed 2 years ago, and has not followed up with a primary doctor for this since  - uncertain whether she received treatment for this  - HCV ab and PCR drawn on admission, pending

## 2020-07-13 NOTE — CONSULTS
[facility]  Obstetrics & Gynecology  Consult Note    Patient Name: Mackenzie Montalvo  MRN: [id number]  Admission Date: 2020  Hospital Length of Stay: 0 days  Code Status: Full Code  Primary Care Provider: Primary Doctor No  Principal Problem: Pyelonephritis affecting pregnancy, antepartum    Consults  Subjective:     Chief Complaint: body aches, fever, abdominal pain    History of Present Illness:  Mackenzie Montalvo is a 29 y.o.  at approximately 12-13weeks gestation based on ED bedside U/S with no PNC due to lack of insurance and pmh significant for hepatitis-C, polysubstance abuse with last methamphetamine and heroin use 3 days ago presents to the ED for evaluation of generalized body aches, abdominal pain and fever. She also endorses nausea, but no emesis. She states that the pain started a few days ago, and was localized in the LLQ, but now radiates to the L flank and R side. She reports body aches and chills. She does state that she was seen at another facility where they did a bedside ultrasound and confirmed IUP with estimated gestation at 12-13 weeks with fetal heart tones reported by ED physician at that time. She denies any cough, URI symptoms, hemoptysis, shortness of breath or chest pain at this time. She denies any vaginal discharge, dysuria, hematuria or vaginal bleeding.  She has not tried any medications for symptoms.    She is a current every day smoker, smokes approximately 0.5-1ppd. She denies any alcohol use. She endorses IVDU, most often methamphetamine and most recent use was approximately 3 days ago, before she found out she was pregnant. She also endorses heroine use. She denies ever using methadone or subutex.    She is living with her sister at this time, but is from [place] and is working to set up insurance both in [place] and in [place] because she is not sure where she will be living long term. She has 1 sexual partner, her boyfriend of the last 3 months, but she also endorses  that she is uncertain the paternity of this baby. She states that she feels safe at home.         OB History   No obstetric history on file.     History reviewed. No pertinent past medical history.  No past surgical history on file.    (Not in a hospital admission)      Review of patient's allergies indicates:  No Known Allergies     Family History     None        Tobacco Use    Smoking status: Not on file   Substance and Sexual Activity    Alcohol use: Not on file    Drug use: Not on file    Sexual activity: Not on file     Review of Systems   Constitutional: Positive for chills and fever. Negative for fatigue.   HENT: Negative for nasal congestion.    Eyes: Negative for visual disturbance.   Respiratory: Negative for cough and shortness of breath.    Cardiovascular: Negative for chest pain and leg swelling.   Gastrointestinal: Positive for abdominal pain. Negative for constipation, diarrhea, nausea and vomiting.   Endocrine: Negative for hot flashes.   Genitourinary: Positive for flank pain and frequency. Negative for dysuria, pelvic pain, vaginal bleeding and vaginal discharge.   Musculoskeletal: Positive for back pain.   Integumentary:  Negative for rash, breast skin changes and breast tenderness.   Neurological: Negative for headaches.   Psychiatric/Behavioral: Negative for depression.   Breast: Negative for skin changes and tenderness     Objective:     Vital Signs (Most Recent):  Temp: (!) 100.5 °F (38.1 °C) (07/12/20 1816)  Pulse: (!) 125 (07/12/20 1741)  Resp: (!) 24 (07/12/20 1533)  BP: (!) 113/59 (07/12/20 1739)  SpO2: 97 % (07/12/20 1739) Vital Signs (24h Range):  Temp:  [100.5 °F (38.1 °C)-101.4 °F (38.6 °C)] 100.5 °F (38.1 °C)  Pulse:  [125-131] 125  Resp:  [24] 24  SpO2:  [97 %] 97 %  BP: (113-125)/(59-74) 113/59     Weight: 72.6 kg (160 lb)  Body mass index is 25.82 kg/m².    No LMP recorded.    Physical Exam:   Constitutional: She is oriented to person, place, and time. She appears  well-developed and well-nourished. No distress.    HENT:   Head: Normocephalic and atraumatic.     Neck: Normal range of motion.    Cardiovascular: Normal rate.     Pulmonary/Chest: Effort normal. No respiratory distress.        Abdominal: Soft. Bowel sounds are normal. She exhibits no distension, no mass and no abdominal incision. There is abdominal tenderness ( TTP throughout lower abdomen, no rebound or guarding). No hernia.     Genitourinary:    Vagina and uterus normal.   Uterus is not enlarged and not tender. Cervix is normal. Right adnexum displays no tenderness and no fullness. Left adnexum displays no tenderness and no fullness.    Genitourinary Comments: Uterus approximately 10 week size, no CMT  SSE reveals no discharge, closed cervical os.   Normal urethra/ urethral meatus  Normal external genitalia   negative for vaginal discharge          Musculoskeletal: Normal range of motion. No tenderness or edema.       Neurological: She is alert and oriented to person, place, and time.    Skin: Skin is warm and dry.    Psychiatric: She has a normal mood and affect.       Laboratory:  CBC:   Recent Labs   Lab 07/12/20  1639   WBC 15.51*   RBC 4.24   HGB 13.3   HCT 38.3      MCV 90   MCH 31.4*   MCHC 34.7     CMP:   Recent Labs   Lab 07/12/20  1639      CALCIUM 9.2   ALBUMIN 3.1*   PROT 7.0      K 3.7   CO2 25   CL 99   BUN 8   CREATININE 0.8   ALKPHOS 64   ALT 32   AST 23   BILITOT 0.5     Recent Labs   Lab 07/12/20  1652   COLORU Yellow   SPECGRAV 1.015   PHUR 7.0   PROTEINUA 1+*   BACTERIA Many*   NITRITE Positive*   LEUKOCYTESUR 2+*   UROBILINOGEN Negative   HYALINECASTS 0     Lactate 2.1    Urine culture- pending    Blood culture- pending    Diagnostic Results:    CXR- negative    TVUS- pending    Assessment/Plan:     * Pyelonephritis affecting pregnancy, antepartum  - VS with 101.4 temp on admission, tachycardic to 120s, normotensive and )2 sats wnl  - Covid negative  - PE with + CVA  tenderness, R>L, no vaginal discharge, no CMT  - Labs:        CBC: 15.5/13/38/245       CMP wnl       Lactate 2.1       UA: + Nitrite, 2+ leukocyte, 1+ protein, + blood       Urine Cx 7/12: pending       Blood Cx 7/12: pending       GCCT and vaginosis screen: pending    - s/p 2L NS in ED, and 1g tylenol  - IV Rocephin 1g/50mL q24 hours   - PRN phenergan/ zofran for nausea  - Cont to monitor temp and pain closely      Chronic hepatitis C virus infection  - pt states that she was diagnosed 2 years ago, and has not followed up with a primary doctor for this since  - uncertain whether she received treatment for this  - hep C ab and PCR drawn on admission    Tobacco use  - patient is a current every day smoker  - denies wanting to quit at this time and denies tobacco patch    IV drug abuse  - Patient known IVDU- current user of methamphetamine and also endorses using heroine  - Has never been on any form of methadone or subutex  - no current s/s of withdrawal  - May order telepsych consult due to drug abuse and need for possible addiction medicine follow up    Limited prenatal care  - Patient with pregnancy of unknown gestational age- dated approximately 12-13 weeks 3 days ago with bedside ED ultrasound. [place] is from [place], and is attempting to file for insurance. Living with sister currently, and uncertain whether she will be staying in [place] or going back to [place] for long term PNC.   - TVUS ordered from ED for official dating.   - Will order PNV  - [facility] to [facility] PNC.       Thank you for your consult. I will follow-up with patient. Please contact us if you have any additional questions.    Elizabeth Booth MD  Obstetrics & Gynecology  [facility] Attending:   Plan of care and pt history reviewed with the resident. Unfortunately, the patient left AMA on am of 7/13/20 and I was not able to examine her or personally interview her.  No E/M bill was  sumitted.   Edita Atwood MD

## 2020-07-13 NOTE — ASSESSMENT & PLAN NOTE
- VS with 101.4 temp on admission, tachycardic to 120s, normotensive and )2 sats wnl  - Covid negative  - PE with + CVA tenderness, R>L, no vaginal discharge, no CMT  - Labs:        CBC: 15.5/13/38/245       CMP wnl       Lactate 2.1       UA: + Nitrite, 2+ leukocyte, 1+ protein, + blood       Urine Cx 7/12: pending       Blood Cx 7/12: pending       GCCT and vaginosis screen: pending    - s/p 2L NS in ED, and 1g tylenol  - IV Rocephin 1g/50mL q24 hours   - PRN phenergan/ zofran for nausea  - Cont to monitor temp and pain closely

## 2020-07-13 NOTE — ASSESSMENT & PLAN NOTE
- VS afebrile since 7/12 1816, tachycardic to 110s, normotensive, O2 sats wnl  - COVID negative  - PE with + CVA tenderness, R>L  - Labs:        CBC: WBC 15>16.9 H/H 11.5/34.3 Platelets 230       CMP wnl AST/ALT 23/32       Lactate 2.1       UA: + Nitrite, 2+ leukocyte, 1+ protein, + blood       Urine Cx 7/12: pending       Blood Cx 7/12: pending, NGTD x1 day       GCCT and vaginosis screen: pending    - s/p 2L NS in ED, and 1g tylenol  - IV Rocephin 1g/50mL q24 hours   - PRN phenergan/zofran for nausea  - Cont to monitor temp and pain closely

## 2020-07-13 NOTE — HOSPITAL COURSE
07/13/2020 Patient with continued complaint of right flank pain radiating to groin. Denies dysuria, hematuria, fever, chills. Reports chronic constipation, last BM yesterday, no flatus since admit. No signs or symptoms of withdrawal, reports last use of heroine and methamphetamines as 4 days ago. 1st trimester labs, HCV quant, urine culture, blood culture x2 pending. WBC 15>17. Rocephin started on 7/12.

## 2020-07-13 NOTE — SUBJECTIVE & OBJECTIVE
OB History    Para Term  AB Living   1 0 0 0 0 0   SAB TAB Ectopic Multiple Live Births   0 0 0 0 0      # Outcome Date GA Lbr Alan/2nd Weight Sex Delivery Anes PTL Lv   1 Current              History reviewed. No pertinent past medical history.  No past surgical history on file.    No medications prior to admission.       Review of patient's allergies indicates:  No Known Allergies     Family History     None        Tobacco Use    Smoking status: Never Smoker    Smokeless tobacco: Never Used   Substance and Sexual Activity    Alcohol use: Not Currently    Drug use: Yes     Types: IV    Sexual activity: Yes     Partners: Male     Review of Systems   Constitutional: Negative for chills, fatigue and fever.   HENT: Negative for nasal congestion.    Eyes: Negative for visual disturbance.   Respiratory: Negative for cough and shortness of breath.    Cardiovascular: Negative for chest pain and leg swelling.   Gastrointestinal: Positive for abdominal pain and constipation. Negative for diarrhea, nausea and vomiting.   Endocrine: Negative for hot flashes.   Genitourinary: Positive for flank pain. Negative for dysuria, frequency, pelvic pain, vaginal bleeding and vaginal discharge.   Musculoskeletal: Positive for back pain.   Integumentary:  Negative for rash, breast skin changes and breast tenderness.   Neurological: Negative for headaches.   Psychiatric/Behavioral: Negative for depression.   Breast: Negative for skin changes and tenderness     Objective:     Vital Signs (Most Recent):  Temp: 98.7 °F (37.1 °C) (20 044)  Pulse: (!) 112 (20 044)  Resp: 18 (20 044)  BP: 113/61 (20)  SpO2: 100 % (20) Vital Signs (24h Range):  Temp:  [97.5 °F (36.4 °C)-101.4 °F (38.6 °C)] 98.7 °F (37.1 °C)  Pulse:  [107-131] 112  Resp:  [17-24] 18  SpO2:  [96 %-100 %] 100 %  BP: ()/(55-74) 113/61     Weight: 72.6 kg (160 lb)  Body mass index is 25.82 kg/m².    No LMP recorded  (lmp unknown). Patient is pregnant.    Physical Exam:   Constitutional: She is oriented to person, place, and time. She appears well-developed and well-nourished. No distress.    HENT:   Head: Normocephalic and atraumatic.     Neck: Normal range of motion.    Cardiovascular: Normal rate.     Pulmonary/Chest: Effort normal. No respiratory distress.        Abdominal: Soft. Bowel sounds are normal. She exhibits no distension, no mass and no abdominal incision. There is abdominal tenderness ( TTP throughout lower abdomen, no rebound or guarding). There is no rebound and no guarding. No hernia.     Genitourinary:    Genitourinary Comments: CVA tenderness R>L             Musculoskeletal: Normal range of motion. No tenderness or edema.       Neurological: She is alert and oriented to person, place, and time.    Skin: Skin is warm and dry.    Psychiatric: She has a normal mood and affect.       Laboratory:  CBC:   Recent Labs   Lab 07/13/20  0412   WBC 16.90*   RBC 3.76*   HGB 11.5*   HCT 34.3*      MCV 91   MCH 30.6   MCHC 33.5     CMP:   Recent Labs   Lab 07/12/20  1639      CALCIUM 9.2   ALBUMIN 3.1*   PROT 7.0      K 3.7   CO2 25   CL 99   BUN 8   CREATININE 0.8   ALKPHOS 64   ALT 32   AST 23   BILITOT 0.5     Recent Labs   Lab 07/12/20  1652   COLORU Yellow   SPECGRAV 1.015   PHUR 7.0   PROTEINUA 1+*   BACTERIA Many*   NITRITE Positive*   LEUKOCYTESUR 2+*   UROBILINOGEN Negative   HYALINECASTS 0     Lactate 2.1    Urine culture - pending    Blood culture - pending, no growth to date x1 day    Diagnostic Results:    CXR - negative    TVUS - Single living intrauterine gestation, crown-rump length correlates with an estimated gestational age of 13 weeks 0 days, cardiac activity documented at 172 beats per minute.  There is thin fluid in the cervical canal.

## 2020-07-14 LAB
BACTERIA UR CULT: ABNORMAL
RUBV IGG SER-ACNC: 15.9 IU/ML
RUBV IGG SER-IMP: REACTIVE

## 2020-07-15 LAB
C TRACH DNA SPEC QL NAA+PROBE: NOT DETECTED
N GONORRHOEA DNA SPEC QL NAA+PROBE: NOT DETECTED

## 2020-07-16 LAB
HCV RNA SERPL NAA+PROBE-LOG IU: 5.57 LOG (10) IU/ML
HCV RNA SERPL QL NAA+PROBE: DETECTED IU/ML
HCV RNA SPEC NAA+PROBE-ACNC: ABNORMAL IU/ML

## 2020-07-17 LAB — BACTERIA BLD CULT: NORMAL

## 2020-07-18 LAB — BACTERIA BLD CULT: NORMAL

## 2023-08-15 ENCOUNTER — OFFICE VISIT (OUTPATIENT)
Dept: OBSTETRICS AND GYNECOLOGY | Facility: CLINIC | Age: 33
End: 2023-08-15
Payer: MEDICAID

## 2023-08-15 ENCOUNTER — LAB VISIT (OUTPATIENT)
Dept: LAB | Facility: HOSPITAL | Age: 33
End: 2023-08-15
Payer: MEDICAID

## 2023-08-15 VITALS
DIASTOLIC BLOOD PRESSURE: 76 MMHG | WEIGHT: 162.56 LBS | BODY MASS INDEX: 26.13 KG/M2 | HEIGHT: 66 IN | SYSTOLIC BLOOD PRESSURE: 128 MMHG

## 2023-08-15 DIAGNOSIS — N91.2 AMENORRHEA: Primary | ICD-10-CM

## 2023-08-15 DIAGNOSIS — Z86.19 HISTORY OF HEPATITIS C: ICD-10-CM

## 2023-08-15 DIAGNOSIS — Z32.01 PREGNANCY CONFIRMED BY POSITIVE URINE TEST: ICD-10-CM

## 2023-08-15 LAB
ABO + RH BLD: NORMAL
ANION GAP SERPL CALC-SCNC: 11 MMOL/L (ref 8–16)
B-HCG UR QL: POSITIVE
BASOPHILS # BLD AUTO: 0.08 K/UL (ref 0–0.2)
BASOPHILS NFR BLD: 0.7 % (ref 0–1.9)
BLD GP AB SCN CELLS X3 SERPL QL: NORMAL
BUN SERPL-MCNC: 15 MG/DL (ref 6–20)
CALCIUM SERPL-MCNC: 9.7 MG/DL (ref 8.7–10.5)
CHLORIDE SERPL-SCNC: 105 MMOL/L (ref 95–110)
CO2 SERPL-SCNC: 25 MMOL/L (ref 23–29)
CREAT SERPL-MCNC: 0.8 MG/DL (ref 0.5–1.4)
CTP QC/QA: YES
DIFFERENTIAL METHOD: ABNORMAL
EOSINOPHIL # BLD AUTO: 0.6 K/UL (ref 0–0.5)
EOSINOPHIL NFR BLD: 5.1 % (ref 0–8)
ERYTHROCYTE [DISTWIDTH] IN BLOOD BY AUTOMATED COUNT: 12 % (ref 11.5–14.5)
EST. GFR  (NO RACE VARIABLE): >60 ML/MIN/1.73 M^2
GLUCOSE SERPL-MCNC: 93 MG/DL (ref 70–110)
HCT VFR BLD AUTO: 38.1 % (ref 37–48.5)
HGB BLD-MCNC: 13.2 G/DL (ref 12–16)
IMM GRANULOCYTES # BLD AUTO: 0.06 K/UL (ref 0–0.04)
IMM GRANULOCYTES NFR BLD AUTO: 0.5 % (ref 0–0.5)
LYMPHOCYTES # BLD AUTO: 1.7 K/UL (ref 1–4.8)
LYMPHOCYTES NFR BLD: 15.3 % (ref 18–48)
MCH RBC QN AUTO: 33.8 PG (ref 27–31)
MCHC RBC AUTO-ENTMCNC: 34.6 G/DL (ref 32–36)
MCV RBC AUTO: 97 FL (ref 82–98)
MONOCYTES # BLD AUTO: 0.5 K/UL (ref 0.3–1)
MONOCYTES NFR BLD: 4.7 % (ref 4–15)
NEUTROPHILS # BLD AUTO: 8.1 K/UL (ref 1.8–7.7)
NEUTROPHILS NFR BLD: 73.7 % (ref 38–73)
NRBC BLD-RTO: 0 /100 WBC
PLATELET # BLD AUTO: 316 K/UL (ref 150–450)
PMV BLD AUTO: 8.8 FL (ref 9.2–12.9)
POTASSIUM SERPL-SCNC: 3.7 MMOL/L (ref 3.5–5.1)
RBC # BLD AUTO: 3.91 M/UL (ref 4–5.4)
SODIUM SERPL-SCNC: 141 MMOL/L (ref 136–145)
SPECIMEN OUTDATE: NORMAL
TSH SERPL DL<=0.005 MIU/L-ACNC: 1.02 UIU/ML (ref 0.4–4)
WBC # BLD AUTO: 10.93 K/UL (ref 3.9–12.7)

## 2023-08-15 PROCEDURE — 87491 CHLMYD TRACH DNA AMP PROBE: CPT

## 2023-08-15 PROCEDURE — 86592 SYPHILIS TEST NON-TREP QUAL: CPT

## 2023-08-15 PROCEDURE — 1159F MED LIST DOCD IN RCRD: CPT | Mod: CPTII,,,

## 2023-08-15 PROCEDURE — 87077 CULTURE AEROBIC IDENTIFY: CPT

## 2023-08-15 PROCEDURE — 3078F PR MOST RECENT DIASTOLIC BLOOD PRESSURE < 80 MM HG: ICD-10-PCS | Mod: CPTII,,,

## 2023-08-15 PROCEDURE — 99204 PR OFFICE/OUTPT VISIT, NEW, LEVL IV, 45-59 MIN: ICD-10-PCS | Mod: S$PBB,TH,,

## 2023-08-15 PROCEDURE — 88175 CYTOPATH C/V AUTO FLUID REDO: CPT

## 2023-08-15 PROCEDURE — 86803 HEPATITIS C AB TEST: CPT

## 2023-08-15 PROCEDURE — 3008F PR BODY MASS INDEX (BMI) DOCUMENTED: ICD-10-PCS | Mod: CPTII,,,

## 2023-08-15 PROCEDURE — 86762 RUBELLA ANTIBODY: CPT

## 2023-08-15 PROCEDURE — 81025 URINE PREGNANCY TEST: CPT | Mod: PBBFAC

## 2023-08-15 PROCEDURE — 87088 URINE BACTERIA CULTURE: CPT

## 2023-08-15 PROCEDURE — 3008F BODY MASS INDEX DOCD: CPT | Mod: CPTII,,,

## 2023-08-15 PROCEDURE — 3074F PR MOST RECENT SYSTOLIC BLOOD PRESSURE < 130 MM HG: ICD-10-PCS | Mod: CPTII,,,

## 2023-08-15 PROCEDURE — 99203 OFFICE O/P NEW LOW 30 MIN: CPT | Mod: PBBFAC,TH

## 2023-08-15 PROCEDURE — 99999PBSHW POCT URINE PREGNANCY: ICD-10-PCS | Mod: PBBFAC,,,

## 2023-08-15 PROCEDURE — 87340 HEPATITIS B SURFACE AG IA: CPT

## 2023-08-15 PROCEDURE — 87624 HPV HI-RISK TYP POOLED RSLT: CPT | Mod: 59

## 2023-08-15 PROCEDURE — 99999 PR PBB SHADOW E&M-NEW PATIENT-LVL III: ICD-10-PCS | Mod: PBBFAC,,,

## 2023-08-15 PROCEDURE — 99999PBSHW POCT URINE PREGNANCY: Mod: PBBFAC,,,

## 2023-08-15 PROCEDURE — 81220 CFTR GENE COM VARIANTS: CPT

## 2023-08-15 PROCEDURE — 87389 HIV-1 AG W/HIV-1&-2 AB AG IA: CPT

## 2023-08-15 PROCEDURE — 84443 ASSAY THYROID STIM HORMONE: CPT

## 2023-08-15 PROCEDURE — 86900 BLOOD TYPING SEROLOGIC ABO: CPT

## 2023-08-15 PROCEDURE — 87086 URINE CULTURE/COLONY COUNT: CPT

## 2023-08-15 PROCEDURE — 99999 PR PBB SHADOW E&M-NEW PATIENT-LVL III: CPT | Mod: PBBFAC,,,

## 2023-08-15 PROCEDURE — 1159F PR MEDICATION LIST DOCUMENTED IN MEDICAL RECORD: ICD-10-PCS | Mod: CPTII,,,

## 2023-08-15 PROCEDURE — 85025 COMPLETE CBC W/AUTO DIFF WBC: CPT

## 2023-08-15 PROCEDURE — 87186 SC STD MICRODIL/AGAR DIL: CPT

## 2023-08-15 PROCEDURE — 81514 NFCT DS BV&VAGINITIS DNA ALG: CPT

## 2023-08-15 PROCEDURE — 3078F DIAST BP <80 MM HG: CPT | Mod: CPTII,,,

## 2023-08-15 PROCEDURE — 3074F SYST BP LT 130 MM HG: CPT | Mod: CPTII,,,

## 2023-08-15 PROCEDURE — 99204 OFFICE O/P NEW MOD 45 MIN: CPT | Mod: S$PBB,TH,,

## 2023-08-15 PROCEDURE — 80048 BASIC METABOLIC PNL TOTAL CA: CPT

## 2023-08-15 PROCEDURE — 87591 N.GONORRHOEAE DNA AMP PROB: CPT

## 2023-08-15 RX ORDER — ONDANSETRON 4 MG/1
4 TABLET, ORALLY DISINTEGRATING ORAL EVERY 6 HOURS PRN
Qty: 60 TABLET | Refills: 2 | Status: SHIPPED | OUTPATIENT
Start: 2023-08-15 | End: 2023-08-15

## 2023-08-15 RX ORDER — PRENATAL WITH FERROUS FUM AND FOLIC ACID 3080; 920; 120; 400; 22; 1.84; 3; 20; 10; 1; 12; 200; 27; 25; 2 [IU]/1; [IU]/1; MG/1; [IU]/1; MG/1; MG/1; MG/1; MG/1; MG/1; MG/1; UG/1; MG/1; MG/1; MG/1; MG/1
1 TABLET ORAL DAILY
Qty: 30 TABLET | Refills: 11 | Status: SHIPPED | OUTPATIENT
Start: 2023-08-15 | End: 2024-08-14

## 2023-08-15 RX ORDER — ONDANSETRON 4 MG/1
4 TABLET, ORALLY DISINTEGRATING ORAL EVERY 6 HOURS PRN
Qty: 60 TABLET | Refills: 2 | Status: ON HOLD | OUTPATIENT
Start: 2023-08-15 | End: 2024-02-26 | Stop reason: HOSPADM

## 2023-08-15 RX ORDER — PRENATAL WITH FERROUS FUM AND FOLIC ACID 3080; 920; 120; 400; 22; 1.84; 3; 20; 10; 1; 12; 200; 27; 25; 2 [IU]/1; [IU]/1; MG/1; [IU]/1; MG/1; MG/1; MG/1; MG/1; MG/1; MG/1; UG/1; MG/1; MG/1; MG/1; MG/1
1 TABLET ORAL DAILY
Qty: 30 TABLET | Refills: 11 | Status: SHIPPED | OUTPATIENT
Start: 2023-08-15 | End: 2023-08-15

## 2023-08-15 NOTE — PROGRESS NOTES
CC: Positive Pregnancy Test    HISTORY OF PRESENT ILLNESS:    Mackenzie Montalvo is a 32 y.o. female, ,  Presents today for a routine exam complaining of oligomenorrhea and positive home urine pregnancy test.  Patient's last menstrual period was 2023 (approximate).   She is not currently on any contraception. UPT is Positive.  Pregnancy is desired. Sexual Activity: single partner, contraception: none. Last period was normal.  She is not taking a PNV.  She reports nausea/vomiting. Current symptoms also include: morning sickness, nausea, and positive home pregnancy test. Denies vaginal bleeding and pelvic pain.    Denies abdominal surgeries, medications, genetic family history (SC/CF). No personal or family history of DM. No personal or family history of thyroid disease. No h/o HSV. She has a history of IV drug use and Chronic Hepatitis C. Feels safe at home.     OB history:     Prior pregnancies 1  1   Complications: none     Last Pap: does not recall    ROS:  GENERAL: No weight changes. No swelling. No fatigue. No fever.  CARDIOVASCULAR: No chest pain. No shortness of breath. No leg cramps.   NEUROLOGICAL: No headaches. No vision changes.  BREASTS: No pain. No lumps. No discharge.  ABDOMEN: No pain. No diarrhea. No constipation.  REPRODUCTIVE: No abnormal bleeding.   VULVA: No pain. No lesions. No itching.  VAGINA: No relaxation. No itching. No odor. No discharge. No lesions.  URINARY: No incontinence. No nocturia. No frequency. No dysuria.    MEDICATIONS AND ALLERGIES:  Reviewed    COMPREHENSIVE GYN HISTORY:  PAP History: Denies abnormal Paps.  Infection History: Denies STDs. Denies PID.  Benign History: Denies uterine fibroids. Denies ovarian cysts. Denies endometriosis. Denies other conditions.  Cancer History: Denies cervical cancer. Denies uterine cancer or hyperplasia. Denies ovarian cancer. Denies vulvar cancer or pre-cancer. Denies vaginal cancer or pre-cancer. Denies breast cancer. Denies  "colon cancer.  Sexual Activity History: Reports currently being sexually active  Menstrual History: None.  Contraception: None    /76 (BP Location: Left arm, Patient Position: Sitting)   Ht 5' 6" (1.676 m)   Wt 73.8 kg (162 lb 9.4 oz)   LMP 2023 (Approximate)   Breastfeeding No   BMI 26.24 kg/m²     PE:  AFFECT: Calm, alert and oriented X 3. Interactive during exam  GENERAL: Appears well-nourished, well-developed, in no acute distress.  HEAD: Normocephalic, atruamatic  TEETH: Good dentition.  THYROID: No thyromegally   BREASTS: No masses, skin changes, nipple discharge or adenopathy bilaterally.  SKIN: Normal for race, warm, & dry. No lesions or rashes.  LUNGS: Easy and unlabored, clear to auscultation bilaterally.  HEART: Regular rate and rhythm   ABDOMEN: Soft and nontender without masses or organomegally.  VULVA: No lesions, masses or tenderness.  VAGINA: Moist and well rugated without lesions or discharge.  CERVIX: Moist and pink without lesions, discharge or tenderness.      UTERUS SIZE: 12 week size, nontender and without masses.  ADNEXA: No masses or tenderness.  ESTIMATE OF PELVIC CAPACITY: Adequate  EXTREMITIES: No cyanosis, clubbing or edema. No calf tenderness.  LYMPH NODES: No axillary or inguinal adenopathy.    PROCEDURES:  UPT Positive  Affirm  Pap    FHTs 160s    ASSESSMENT/PLAN:  Amenorrhea  Positive urine pregnancy test (KAMI: 2024, EGA: 12w 6d based on LMP:2023)    -  Routine prenatal care    Nausea and vomiting in pregnancy    -  Education regarding lifestyle and dietary modifications    -  Advised use of B6/Unisom. Pt will notify us if no relief/worsening symptoms, will consider alternative therapies prn    1st TRIMESTER COUNSELING: Discussed all, booklet provided:  Common complaints of pregnancy  HIV and other routine prenatal tests including  genetic screening  Risk factors identified by prenatal history  Oriented to practice - discussed anticipated course of " prenatal care & indications for Ultrasound  Childbirth classes/Hospital facilities   Nutrition and weight gain counseling  -- Discussed IOM recommended weight gain of:          Underweight        Less than 18.5            28-40            Normal Weight     18.5-24.9                    25-35            Overweight          25-29.9                       15-25            Obese                  30 and greater             11-20    -- Discussed criteria for delivery at [facility] r/t excessive pre-preg weight or excessive weight gain:          Pre-pregnancy BMI over 40 or excess pregnancy weight gain defined as:          Pre-preg BMI < 18.5; Excess weight gain = > 60 pound          Pre-preg BMI 18.5-24.9;  Excess weight gain = > 53 pounds          Pre-preg BMI 25-29.9;  Excess weight gain = > 38 pounds          Pre-preg BMI > 30;  Excess weight gain = > 30 pounds  Toxoplasmosis precautions (Cats/Raw Meat)  Sexual activity and exercise  Environmental/Work hazards  Travel  Tobacco (Ask, Advise, Assess, Assist, and Arrange), as well as alcohol and drug use  Use of any medications (Including supplements, Vitamins, Herbs, or OTC Drugs)  Domestic violence  Seat belt use      Dating US ordered  Indications for low-dose aspirin use after 12 weeks for the prevention of pre-eclampsia reviewed.  For this patient, her high risk factors include None. Her moderate risk factors include:  Sociodemographic characteristics: low socioeconomic status.  Patient is not a candidate for low-dose aspirin.  Anatomy US 19-20 weeks   Routine serum and urine prenatal labs today    1. Amenorrhea  - POCT urine pregnancy    2. Pregnancy confirmed by positive urine test  - US OB/GYN Procedure ([facility]) - Extended List; Future  - HIV 1/2 Ag/Ab (4th Gen); Future  - RPR; Future  - Hepatitis B surface antigen; Future  - Type & Screen; Future  - Rubella antibody, IgG; Future  - Urine culture  - CBC auto differential; Future  - Basic metabolic panel; Future  - TSH;  Future  - C. trachomatis/N. gonorrhoeae by AMP DNA [facility]; Urine  - Cystic Fibrosis Mutation Panel; Future  - Vaginosis Screen by DNA Probe  - ondansetron (ZOFRAN-ODT) 4 MG TbDL; dissolve 1 tablet (4 mg total) by mouth every 6 (six) hours as needed (nausea/vomiting).  Dispense: 60 tablet; Refill: 2  - PNV,calcium 72/iron/folic acid (PRENATAL VITAMIN) Tab; Take 1 tablet by mouth once daily.  Dispense: 30 tablet; Refill: 11  - Liquid-Based Pap Smear, Screening  - HPV High Risk Genotypes, PCR    3. History of hepatitis C  - Hepatitis C Antibody; Future  - Ambulatory referral/consult to Perinatology; Future      FOLLOW-UP in 4 weeks with Dr. Krystyna Esteban, NP-C    OB/GYN

## 2023-08-16 ENCOUNTER — TELEPHONE (OUTPATIENT)
Dept: MATERNAL FETAL MEDICINE | Facility: CLINIC | Age: 33
End: 2023-08-16
Payer: MEDICAID

## 2023-08-16 LAB
C TRACH DNA SPEC QL NAA+PROBE: NOT DETECTED
HBV SURFACE AG SERPL QL IA: NORMAL
HCV AB SERPL QL IA: REACTIVE
HIV 1+2 AB+HIV1 P24 AG SERPL QL IA: NORMAL
N GONORRHOEA DNA SPEC QL NAA+PROBE: NOT DETECTED

## 2023-08-16 NOTE — TELEPHONE ENCOUNTER
Call to patient and it states that this number is disconnected or no longer in service. No other numbers listed for patient in system.

## 2023-08-17 ENCOUNTER — TELEPHONE (OUTPATIENT)
Dept: MATERNAL FETAL MEDICINE | Facility: CLINIC | Age: 33
End: 2023-08-17
Payer: MEDICAID

## 2023-08-17 LAB
BACTERIA UR CULT: ABNORMAL
RPR SER QL: NORMAL
RUBV IGG SER-ACNC: 18.7 IU/ML
RUBV IGG SER-IMP: REACTIVE

## 2023-08-17 NOTE — TELEPHONE ENCOUNTER
Attempted to contact patient and it stated this number is no longer in service. Attempted to contact emergency contact. No answer and did not leave a voicemail.

## 2023-08-18 ENCOUNTER — TELEPHONE (OUTPATIENT)
Dept: OBSTETRICS AND GYNECOLOGY | Facility: CLINIC | Age: 33
End: 2023-08-18
Payer: MEDICAID

## 2023-08-18 ENCOUNTER — TELEPHONE (OUTPATIENT)
Dept: PAIN MEDICINE | Facility: CLINIC | Age: 33
End: 2023-08-18
Payer: MEDICAID

## 2023-08-18 DIAGNOSIS — O23.41 URINARY TRACT INFECTION IN MOTHER DURING FIRST TRIMESTER OF PREGNANCY: ICD-10-CM

## 2023-08-18 DIAGNOSIS — O23.41 URINARY TRACT INFECTION IN MOTHER DURING FIRST TRIMESTER OF PREGNANCY: Primary | ICD-10-CM

## 2023-08-18 DIAGNOSIS — Z86.19 HISTORY OF HEPATITIS C: Primary | ICD-10-CM

## 2023-08-18 LAB
BACTERIAL VAGINOSIS DNA: NEGATIVE
CANDIDA GLABRATA DNA: NEGATIVE
CANDIDA KRUSEI DNA: NEGATIVE
CANDIDA RRNA VAG QL PROBE: POSITIVE
CFTR MUT ANL BLD/T: NORMAL
T VAGINALIS RRNA GENITAL QL PROBE: NEGATIVE

## 2023-08-18 RX ORDER — CEPHALEXIN 500 MG/1
500 CAPSULE ORAL EVERY 6 HOURS
Qty: 28 CAPSULE | Refills: 0 | Status: SHIPPED | OUTPATIENT
Start: 2023-08-18 | End: 2023-08-18

## 2023-08-18 RX ORDER — CEPHALEXIN 500 MG/1
500 CAPSULE ORAL EVERY 6 HOURS
Qty: 28 CAPSULE | Refills: 0 | Status: SHIPPED | OUTPATIENT
Start: 2023-08-18 | End: 2023-08-25

## 2023-08-18 NOTE — TELEPHONE ENCOUNTER
Tried to call pt and number has been changed.         ----- Message from Trae Esteban NP sent at 8/18/2023  9:05 AM CDT -----  Can you also let her know:    Your blood shows hepatitis C antibody positive. I will run one more test to check this. We want to be sure that you are not currently infected.  Please head to the lab to have your blood drawn again.      Make sure she follows up with her PCP as well.     Let us know if you have any questions  SHIRIN Figueroa

## 2023-08-18 NOTE — TELEPHONE ENCOUNTER
----- Message from Trae Esteban NP sent at 8/18/2023  8:54 AM CDT -----  Please let patient know the following:     Your urine culture shows that you did have Klebsiella Pneumoniae which is a type of bacteria in your urine. It is sensitive to  Keflex which is the medication that I just sent to your pharmacy. Therefore, the medication, if taking correctly,should clear this up.  This is also safe in pregnancy.     Medication sent to:  [facility] - [facility] - [address] [facility]  [address] [place] [address]  Phone: [phone] Fax: [phone]

## 2023-08-21 ENCOUNTER — TELEPHONE (OUTPATIENT)
Dept: OBSTETRICS AND GYNECOLOGY | Facility: CLINIC | Age: 33
End: 2023-08-21
Payer: MEDICAID

## 2023-08-21 DIAGNOSIS — B37.9 YEAST DETECTED: Primary | ICD-10-CM

## 2023-08-21 RX ORDER — TERCONAZOLE 4 MG/G
1 CREAM VAGINAL NIGHTLY
Qty: 45 G | Refills: 0 | Status: SHIPPED | OUTPATIENT
Start: 2023-08-21 | End: 2023-08-28

## 2023-08-22 LAB
HPV HR 12 DNA SPEC QL NAA+PROBE: NEGATIVE
HPV16 AG SPEC QL: NEGATIVE
HPV18 DNA SPEC QL NAA+PROBE: NEGATIVE

## 2023-08-23 LAB
FINAL PATHOLOGIC DIAGNOSIS: NORMAL
Lab: NORMAL

## 2023-09-25 ENCOUNTER — INITIAL PRENATAL (OUTPATIENT)
Dept: OBSTETRICS AND GYNECOLOGY | Facility: CLINIC | Age: 33
End: 2023-09-25
Payer: MEDICAID

## 2023-09-25 ENCOUNTER — LAB VISIT (OUTPATIENT)
Dept: LAB | Facility: HOSPITAL | Age: 33
End: 2023-09-25
Attending: OBSTETRICS & GYNECOLOGY
Payer: MEDICAID

## 2023-09-25 VITALS
BODY MASS INDEX: 26.58 KG/M2 | SYSTOLIC BLOOD PRESSURE: 132 MMHG | WEIGHT: 164.69 LBS | DIASTOLIC BLOOD PRESSURE: 82 MMHG

## 2023-09-25 DIAGNOSIS — O99.312 ALCOHOL USE AFFECTING PREGNANCY IN SECOND TRIMESTER: ICD-10-CM

## 2023-09-25 DIAGNOSIS — Z3A.18 18 WEEKS GESTATION OF PREGNANCY: ICD-10-CM

## 2023-09-25 DIAGNOSIS — B18.2 CHRONIC HEPATITIS C AFFECTING PREGNANCY, ANTEPARTUM: ICD-10-CM

## 2023-09-25 DIAGNOSIS — B18.2 CHRONIC HEPATITIS C AFFECTING PREGNANCY, ANTEPARTUM: Primary | ICD-10-CM

## 2023-09-25 DIAGNOSIS — O98.419 CHRONIC HEPATITIS C AFFECTING PREGNANCY, ANTEPARTUM: Primary | ICD-10-CM

## 2023-09-25 DIAGNOSIS — O98.419 CHRONIC HEPATITIS C AFFECTING PREGNANCY, ANTEPARTUM: ICD-10-CM

## 2023-09-25 LAB
ALBUMIN SERPL BCP-MCNC: 3.6 G/DL (ref 3.5–5.2)
ALP SERPL-CCNC: 60 U/L (ref 55–135)
ALT SERPL W/O P-5'-P-CCNC: 41 U/L (ref 10–44)
ANION GAP SERPL CALC-SCNC: 9 MMOL/L (ref 8–16)
AST SERPL-CCNC: 63 U/L (ref 10–40)
BILIRUB SERPL-MCNC: 0.4 MG/DL (ref 0.1–1)
BUN SERPL-MCNC: 12 MG/DL (ref 6–20)
CALCIUM SERPL-MCNC: 9.1 MG/DL (ref 8.7–10.5)
CHLORIDE SERPL-SCNC: 104 MMOL/L (ref 95–110)
CO2 SERPL-SCNC: 24 MMOL/L (ref 23–29)
CREAT SERPL-MCNC: 0.8 MG/DL (ref 0.5–1.4)
EST. GFR  (NO RACE VARIABLE): >60 ML/MIN/1.73 M^2
GLUCOSE SERPL-MCNC: 84 MG/DL (ref 70–110)
POTASSIUM SERPL-SCNC: 3.5 MMOL/L (ref 3.5–5.1)
PROT SERPL-MCNC: 7.3 G/DL (ref 6–8.4)
SODIUM SERPL-SCNC: 137 MMOL/L (ref 136–145)

## 2023-09-25 PROCEDURE — 36415 COLL VENOUS BLD VENIPUNCTURE: CPT | Performed by: OBSTETRICS & GYNECOLOGY

## 2023-09-25 PROCEDURE — 80053 COMPREHEN METABOLIC PANEL: CPT | Performed by: OBSTETRICS & GYNECOLOGY

## 2023-09-25 PROCEDURE — 99999 PR PBB SHADOW E&M-EST. PATIENT-LVL III: ICD-10-PCS | Mod: PBBFAC,,, | Performed by: OBSTETRICS & GYNECOLOGY

## 2023-09-25 PROCEDURE — 99213 OFFICE O/P EST LOW 20 MIN: CPT | Mod: PBBFAC,TH | Performed by: OBSTETRICS & GYNECOLOGY

## 2023-09-25 PROCEDURE — 99213 OFFICE O/P EST LOW 20 MIN: CPT | Mod: TH,S$PBB,, | Performed by: OBSTETRICS & GYNECOLOGY

## 2023-09-25 PROCEDURE — 99999 PR PBB SHADOW E&M-EST. PATIENT-LVL III: CPT | Mod: PBBFAC,,, | Performed by: OBSTETRICS & GYNECOLOGY

## 2023-09-25 PROCEDURE — 99213 PR OFFICE/OUTPT VISIT, EST, LEVL III, 20-29 MIN: ICD-10-PCS | Mod: TH,S$PBB,, | Performed by: OBSTETRICS & GYNECOLOGY

## 2023-09-25 NOTE — PROGRESS NOTES
18w5d. Pt reports no complaints.  Patient has hepatitis C and ETOH abuse.   MFM referral placed.  Denies contractions, vaginal bleeding, or leakage of fluid.  Reports adequate fetal movement.  Trisomy screening:declines  F/U 4 weeks. Next visit: routine care     Total time spent on visit was 20 minutes.  This includes face to face time and non-face to face time preparing to see the patient (eg, review of tests), Obtaining and/or reviewing separately obtained history, Documenting clinical information in the electronic or other health record, Independently interpreting resultsand communicating results to the patient/family/caregiver, or Care coordination.

## 2023-10-02 ENCOUNTER — TELEPHONE (OUTPATIENT)
Dept: MATERNAL FETAL MEDICINE | Facility: CLINIC | Age: 33
End: 2023-10-02
Payer: MEDICAID

## 2023-10-02 NOTE — TELEPHONE ENCOUNTER
Did not show for Maternal Fetal Medicine visits today. Did attempt to call patient. No answer and did not leave a voicemail.

## 2023-10-04 ENCOUNTER — PATIENT MESSAGE (OUTPATIENT)
Dept: OTHER | Facility: OTHER | Age: 33
End: 2023-10-04
Payer: MEDICAID

## 2023-10-10 ENCOUNTER — TELEPHONE (OUTPATIENT)
Dept: MATERNAL FETAL MEDICINE | Facility: CLINIC | Age: 33
End: 2023-10-10
Payer: MEDICAID

## 2023-10-10 NOTE — TELEPHONE ENCOUNTER
Attempted to contact patient to schedule her ultrasound and consult with M. No answer, left VM to contact office to schedule.

## 2023-10-23 ENCOUNTER — TELEPHONE (OUTPATIENT)
Dept: OBSTETRICS AND GYNECOLOGY | Facility: CLINIC | Age: 33
End: 2023-10-23
Payer: MEDICAID

## 2023-10-23 NOTE — TELEPHONE ENCOUNTER
----- Message from Joanne Jones sent at 10/23/2023  9:02 AM CDT -----  Regarding: patient call back  Type: Patient Call Back    Who called: Self     What is the request in detail: Is not going to make it on time due to traffic in [facility] and would like to know if she could get a call to come in later.     Can the clinic reply by [facility]? No     Would the patient rather a call back or a response via [facility]? Call     Best call back number: .[phone]

## 2023-11-01 ENCOUNTER — PATIENT MESSAGE (OUTPATIENT)
Dept: OTHER | Facility: OTHER | Age: 33
End: 2023-11-01
Payer: MEDICAID

## 2023-11-20 ENCOUNTER — ROUTINE PRENATAL (OUTPATIENT)
Dept: OBSTETRICS AND GYNECOLOGY | Facility: CLINIC | Age: 33
End: 2023-11-20
Payer: MEDICAID

## 2023-11-20 ENCOUNTER — TELEPHONE (OUTPATIENT)
Dept: MATERNAL FETAL MEDICINE | Facility: CLINIC | Age: 33
End: 2023-11-20
Payer: MEDICAID

## 2023-11-20 ENCOUNTER — PATIENT MESSAGE (OUTPATIENT)
Dept: MATERNAL FETAL MEDICINE | Facility: CLINIC | Age: 33
End: 2023-11-20
Payer: MEDICAID

## 2023-11-20 ENCOUNTER — LAB VISIT (OUTPATIENT)
Dept: LAB | Facility: HOSPITAL | Age: 33
End: 2023-11-20
Attending: OBSTETRICS & GYNECOLOGY
Payer: MEDICAID

## 2023-11-20 VITALS — DIASTOLIC BLOOD PRESSURE: 70 MMHG | WEIGHT: 170 LBS | SYSTOLIC BLOOD PRESSURE: 130 MMHG | BODY MASS INDEX: 27.43 KG/M2

## 2023-11-20 DIAGNOSIS — B18.2 CHRONIC HEPATITIS C AFFECTING PREGNANCY, ANTEPARTUM: Primary | ICD-10-CM

## 2023-11-20 DIAGNOSIS — O98.419 CHRONIC HEPATITIS C AFFECTING PREGNANCY, ANTEPARTUM: ICD-10-CM

## 2023-11-20 DIAGNOSIS — O98.419 CHRONIC HEPATITIS C AFFECTING PREGNANCY, ANTEPARTUM: Primary | ICD-10-CM

## 2023-11-20 DIAGNOSIS — Z3A.26 26 WEEKS GESTATION OF PREGNANCY: ICD-10-CM

## 2023-11-20 DIAGNOSIS — B18.2 CHRONIC HEPATITIS C AFFECTING PREGNANCY, ANTEPARTUM: ICD-10-CM

## 2023-11-20 LAB
ALBUMIN SERPL BCP-MCNC: 3.4 G/DL (ref 3.5–5.2)
ALP SERPL-CCNC: 91 U/L (ref 55–135)
ALT SERPL W/O P-5'-P-CCNC: 26 U/L (ref 10–44)
ANION GAP SERPL CALC-SCNC: 8 MMOL/L (ref 8–16)
AST SERPL-CCNC: 61 U/L (ref 10–40)
BASOPHILS # BLD AUTO: 0.07 K/UL (ref 0–0.2)
BASOPHILS NFR BLD: 0.7 % (ref 0–1.9)
BILIRUB SERPL-MCNC: 0.5 MG/DL (ref 0.1–1)
BUN SERPL-MCNC: 9 MG/DL (ref 6–20)
CALCIUM SERPL-MCNC: 8.6 MG/DL (ref 8.7–10.5)
CHLORIDE SERPL-SCNC: 104 MMOL/L (ref 95–110)
CO2 SERPL-SCNC: 24 MMOL/L (ref 23–29)
CREAT SERPL-MCNC: 0.7 MG/DL (ref 0.5–1.4)
DIFFERENTIAL METHOD: ABNORMAL
EOSINOPHIL # BLD AUTO: 0.2 K/UL (ref 0–0.5)
EOSINOPHIL NFR BLD: 2.1 % (ref 0–8)
ERYTHROCYTE [DISTWIDTH] IN BLOOD BY AUTOMATED COUNT: 13.1 % (ref 11.5–14.5)
EST. GFR  (NO RACE VARIABLE): >60 ML/MIN/1.73 M^2
GLUCOSE SERPL-MCNC: 119 MG/DL (ref 70–140)
GLUCOSE SERPL-MCNC: 80 MG/DL (ref 70–110)
HBV SURFACE AG SERPL QL IA: NORMAL
HCT VFR BLD AUTO: 35.2 % (ref 37–48.5)
HGB BLD-MCNC: 11.8 G/DL (ref 12–16)
HIV 1+2 AB+HIV1 P24 AG SERPL QL IA: NORMAL
IMM GRANULOCYTES # BLD AUTO: 0.06 K/UL (ref 0–0.04)
IMM GRANULOCYTES NFR BLD AUTO: 0.6 % (ref 0–0.5)
LYMPHOCYTES # BLD AUTO: 1.1 K/UL (ref 1–4.8)
LYMPHOCYTES NFR BLD: 10.4 % (ref 18–48)
MCH RBC QN AUTO: 33.6 PG (ref 27–31)
MCHC RBC AUTO-ENTMCNC: 33.5 G/DL (ref 32–36)
MCV RBC AUTO: 100 FL (ref 82–98)
MONOCYTES # BLD AUTO: 0.6 K/UL (ref 0.3–1)
MONOCYTES NFR BLD: 5.5 % (ref 4–15)
NEUTROPHILS # BLD AUTO: 8.2 K/UL (ref 1.8–7.7)
NEUTROPHILS NFR BLD: 80.7 % (ref 38–73)
NRBC BLD-RTO: 0 /100 WBC
PLATELET # BLD AUTO: 326 K/UL (ref 150–450)
PMV BLD AUTO: 8.8 FL (ref 9.2–12.9)
POTASSIUM SERPL-SCNC: 3.6 MMOL/L (ref 3.5–5.1)
PROT SERPL-MCNC: 7.6 G/DL (ref 6–8.4)
RBC # BLD AUTO: 3.51 M/UL (ref 4–5.4)
SODIUM SERPL-SCNC: 136 MMOL/L (ref 136–145)
WBC # BLD AUTO: 10.09 K/UL (ref 3.9–12.7)

## 2023-11-20 PROCEDURE — 87522 HEPATITIS C REVRS TRNSCRPJ: CPT | Performed by: OBSTETRICS & GYNECOLOGY

## 2023-11-20 PROCEDURE — 80053 COMPREHEN METABOLIC PANEL: CPT | Performed by: OBSTETRICS & GYNECOLOGY

## 2023-11-20 PROCEDURE — 86592 SYPHILIS TEST NON-TREP QUAL: CPT | Performed by: OBSTETRICS & GYNECOLOGY

## 2023-11-20 PROCEDURE — 99213 PR OFFICE/OUTPT VISIT, EST, LEVL III, 20-29 MIN: ICD-10-PCS | Mod: TH,S$PBB,, | Performed by: OBSTETRICS & GYNECOLOGY

## 2023-11-20 PROCEDURE — 87389 HIV-1 AG W/HIV-1&-2 AB AG IA: CPT | Performed by: OBSTETRICS & GYNECOLOGY

## 2023-11-20 PROCEDURE — 99213 OFFICE O/P EST LOW 20 MIN: CPT | Mod: TH,S$PBB,, | Performed by: OBSTETRICS & GYNECOLOGY

## 2023-11-20 PROCEDURE — 85025 COMPLETE CBC W/AUTO DIFF WBC: CPT | Performed by: OBSTETRICS & GYNECOLOGY

## 2023-11-20 PROCEDURE — 99211 OFF/OP EST MAY X REQ PHY/QHP: CPT | Mod: PBBFAC,TH | Performed by: OBSTETRICS & GYNECOLOGY

## 2023-11-20 PROCEDURE — 99999 PR PBB SHADOW E&M-EST. PATIENT-LVL I: CPT | Mod: PBBFAC,,, | Performed by: OBSTETRICS & GYNECOLOGY

## 2023-11-20 PROCEDURE — 82950 GLUCOSE TEST: CPT | Performed by: OBSTETRICS & GYNECOLOGY

## 2023-11-20 PROCEDURE — 87340 HEPATITIS B SURFACE AG IA: CPT | Performed by: OBSTETRICS & GYNECOLOGY

## 2023-11-20 PROCEDURE — 99999 PR PBB SHADOW E&M-EST. PATIENT-LVL I: ICD-10-PCS | Mod: PBBFAC,,, | Performed by: OBSTETRICS & GYNECOLOGY

## 2023-11-20 PROCEDURE — 36415 COLL VENOUS BLD VENIPUNCTURE: CPT | Performed by: OBSTETRICS & GYNECOLOGY

## 2023-11-20 NOTE — TELEPHONE ENCOUNTER
Call to patient to schedule her u/s and consult for Maternal Fetal Medicine. No answer. Left voicemail with callback number.

## 2023-11-20 NOTE — PROGRESS NOTES
26w5d . Pt reports no complaints.  Patient has hepatitis C and ETOH abuse.   MFM referral placed.  Denies contractions, vaginal bleeding, or leakage of fluid.  Reports adequate fetal movement.  Trisomy screening:declines  F/U 4 weeks. Next visit: routine care     Total time spent on visit was 20 minutes.  This includes face to face time and non-face to face time preparing to see the patient (eg, review of tests), Obtaining and/or reviewing separately obtained history, Documenting clinical information in the electronic or other health record, Independently interpreting resultsand communicating results to the patient/family/caregiver, or Care coordination.

## 2023-11-21 ENCOUNTER — TELEPHONE (OUTPATIENT)
Dept: MATERNAL FETAL MEDICINE | Facility: CLINIC | Age: 33
End: 2023-11-21
Payer: MEDICAID

## 2023-11-21 LAB — RPR SER QL: NORMAL

## 2023-11-21 NOTE — TELEPHONE ENCOUNTER
Attempted to contact patient to schedule her Maternal Fetal Medicine consult and ultrasound. No answer, left VM to contact office to schedule.

## 2023-11-22 ENCOUNTER — TELEPHONE (OUTPATIENT)
Dept: MATERNAL FETAL MEDICINE | Facility: CLINIC | Age: 33
End: 2023-11-22
Payer: MEDICAID

## 2023-11-22 ENCOUNTER — PATIENT MESSAGE (OUTPATIENT)
Dept: OBSTETRICS AND GYNECOLOGY | Facility: CLINIC | Age: 33
End: 2023-11-22
Payer: MEDICAID

## 2023-11-22 LAB
HCV RNA SERPL NAA+PROBE-LOG IU: 6.99 LOGIU/ML
HCV RNA SERPL QL NAA+PROBE: DETECTED
HCV RNA SPEC NAA+PROBE-ACNC: ABNORMAL IU/ML

## 2023-11-29 ENCOUNTER — PROCEDURE VISIT (OUTPATIENT)
Dept: MATERNAL FETAL MEDICINE | Facility: CLINIC | Age: 33
End: 2023-11-29
Payer: MEDICAID

## 2023-11-29 ENCOUNTER — OFFICE VISIT (OUTPATIENT)
Dept: MATERNAL FETAL MEDICINE | Facility: CLINIC | Age: 33
End: 2023-11-29
Payer: MEDICAID

## 2023-11-29 ENCOUNTER — PATIENT MESSAGE (OUTPATIENT)
Dept: PEDIATRIC CARDIOLOGY | Facility: CLINIC | Age: 33
End: 2023-11-29
Payer: MEDICAID

## 2023-11-29 ENCOUNTER — TELEPHONE (OUTPATIENT)
Dept: PEDIATRIC CARDIOLOGY | Facility: CLINIC | Age: 33
End: 2023-11-29
Payer: MEDICAID

## 2023-11-29 VITALS
DIASTOLIC BLOOD PRESSURE: 80 MMHG | HEIGHT: 66 IN | SYSTOLIC BLOOD PRESSURE: 110 MMHG | WEIGHT: 170 LBS | BODY MASS INDEX: 27.32 KG/M2

## 2023-11-29 DIAGNOSIS — O99.312 ALCOHOL USE AFFECTING PREGNANCY IN SECOND TRIMESTER: ICD-10-CM

## 2023-11-29 DIAGNOSIS — Z36.83 ENCOUNTER FOR FETAL SCREENING FOR CONGENITAL CARDIAC ABNORMALITIES: Primary | ICD-10-CM

## 2023-11-29 DIAGNOSIS — Z72.0 TOBACCO USE: ICD-10-CM

## 2023-11-29 DIAGNOSIS — Z86.19 HISTORY OF HEPATITIS C: ICD-10-CM

## 2023-11-29 DIAGNOSIS — B18.2 CHRONIC HEPATITIS C AFFECTING PREGNANCY, ANTEPARTUM: ICD-10-CM

## 2023-11-29 DIAGNOSIS — O35.8XX0 PREGNANCY COMPLICATED BY FETAL SKELETAL DYSPLASIA, SINGLE GESTATION: Primary | ICD-10-CM

## 2023-11-29 DIAGNOSIS — Z36.2 ENCOUNTER FOR FOLLOW-UP ULTRASOUND OF FETAL ANATOMY: ICD-10-CM

## 2023-11-29 DIAGNOSIS — O98.419 CHRONIC HEPATITIS C AFFECTING PREGNANCY, ANTEPARTUM: ICD-10-CM

## 2023-11-29 DIAGNOSIS — B18.2 CHRONIC HEPATITIS C WITHOUT HEPATIC COMA: ICD-10-CM

## 2023-11-29 PROBLEM — O23.00 PYELONEPHRITIS AFFECTING PREGNANCY, ANTEPARTUM: Status: RESOLVED | Noted: 2020-07-12 | Resolved: 2023-11-29

## 2023-11-29 PROCEDURE — 76811 OB US DETAILED SNGL FETUS: CPT | Mod: PBBFAC | Performed by: OBSTETRICS & GYNECOLOGY

## 2023-11-29 PROCEDURE — 99215 OFFICE O/P EST HI 40 MIN: CPT | Mod: S$PBB,TH,, | Performed by: OBSTETRICS & GYNECOLOGY

## 2023-11-29 PROCEDURE — 99999 PR PBB SHADOW E&M-EST. PATIENT-LVL III: ICD-10-PCS | Mod: PBBFAC,,, | Performed by: OBSTETRICS & GYNECOLOGY

## 2023-11-29 PROCEDURE — 1160F RVW MEDS BY RX/DR IN RCRD: CPT | Mod: CPTII,,, | Performed by: OBSTETRICS & GYNECOLOGY

## 2023-11-29 PROCEDURE — 1160F PR REVIEW ALL MEDS BY PRESCRIBER/CLIN PHARMACIST DOCUMENTED: ICD-10-PCS | Mod: CPTII,,, | Performed by: OBSTETRICS & GYNECOLOGY

## 2023-11-29 PROCEDURE — 76811 US MFM PROCEDURE (VIEWPOINT): ICD-10-PCS | Mod: 26,S$PBB,, | Performed by: OBSTETRICS & GYNECOLOGY

## 2023-11-29 PROCEDURE — 99999 PR PBB SHADOW E&M-EST. PATIENT-LVL III: CPT | Mod: PBBFAC,,, | Performed by: OBSTETRICS & GYNECOLOGY

## 2023-11-29 PROCEDURE — 99213 OFFICE O/P EST LOW 20 MIN: CPT | Mod: PBBFAC,TH | Performed by: OBSTETRICS & GYNECOLOGY

## 2023-11-29 PROCEDURE — 3079F PR MOST RECENT DIASTOLIC BLOOD PRESSURE 80-89 MM HG: ICD-10-PCS | Mod: CPTII,,, | Performed by: OBSTETRICS & GYNECOLOGY

## 2023-11-29 PROCEDURE — 3079F DIAST BP 80-89 MM HG: CPT | Mod: CPTII,,, | Performed by: OBSTETRICS & GYNECOLOGY

## 2023-11-29 PROCEDURE — 1159F MED LIST DOCD IN RCRD: CPT | Mod: CPTII,,, | Performed by: OBSTETRICS & GYNECOLOGY

## 2023-11-29 PROCEDURE — 99215 PR OFFICE/OUTPT VISIT, EST, LEVL V, 40-54 MIN: ICD-10-PCS | Mod: S$PBB,TH,, | Performed by: OBSTETRICS & GYNECOLOGY

## 2023-11-29 PROCEDURE — 1159F PR MEDICATION LIST DOCUMENTED IN MEDICAL RECORD: ICD-10-PCS | Mod: CPTII,,, | Performed by: OBSTETRICS & GYNECOLOGY

## 2023-11-29 PROCEDURE — 3074F SYST BP LT 130 MM HG: CPT | Mod: CPTII,,, | Performed by: OBSTETRICS & GYNECOLOGY

## 2023-11-29 PROCEDURE — 3074F PR MOST RECENT SYSTOLIC BLOOD PRESSURE < 130 MM HG: ICD-10-PCS | Mod: CPTII,,, | Performed by: OBSTETRICS & GYNECOLOGY

## 2023-11-29 PROCEDURE — 3008F PR BODY MASS INDEX (BMI) DOCUMENTED: ICD-10-PCS | Mod: CPTII,,, | Performed by: OBSTETRICS & GYNECOLOGY

## 2023-11-29 PROCEDURE — 99417 PROLNG OP E/M EACH 15 MIN: CPT | Mod: S$PBB,,, | Performed by: OBSTETRICS & GYNECOLOGY

## 2023-11-29 PROCEDURE — 99417 PR PROLONGED SVC, OUTPT, W/WO DIRECT PT CONTACT,  EA ADDTL 15 MIN: ICD-10-PCS | Mod: S$PBB,,, | Performed by: OBSTETRICS & GYNECOLOGY

## 2023-11-29 PROCEDURE — 3008F BODY MASS INDEX DOCD: CPT | Mod: CPTII,,, | Performed by: OBSTETRICS & GYNECOLOGY

## 2023-11-29 NOTE — ASSESSMENT & PLAN NOTE
"Fetal Skeletal Dysplasia  Today's ultrasound findings are suggestive of a fetal skeletal dysplasia with all of the long bones measuring 3-5 standard deviations below the mean, with bowed L femur and sharply bowed R tibia. A detailed fetal anatomic survey was performed suggesting kyphosis/scoliosis vs a hemivertebrae without specific additional findings. Pregnancy dating information was reviewed and confirmed; dates today are based on a 17w dating scan at [facility] retrieved from [facility].     Maternal medical and family histories were reviewed, as was paternal family history. She reports constitutional short stature on her father's side, with his mother being 4'7". She has limited knowledge of her FOB's family history. Her exposure history is remarkable for daily alcohol use, consuming a fifth of vodka daily. No other contributory factors were identified.    Skeletal dysplasias are a heterogenous group of relatively rare conditions involving generalized abnormal bone growth, with a prevalence of approximately 2.4 per 10,000 births. In general, the earlier the gestational age at which skeletal findings are noted, the more likely the findings are to be associated with a severe or lethal skeletal dysplasia. Other genetic conditions or common aneuploidies, maternal medication/teratogen exposures, and maternal medical illnesses can be associated with shortened long bones or skeletal abnormalities. Some skeletal abnormalities are heritable. Neither maternal nor paternal family histories are suggestive of a heritable skeletal dysplasia. The patient has not had cfDNA testing or other fetal aneuploidy screening.     Due to the rarity of most skeletal dysplasias, variable phenotype, and limited assessment available prior to birth, prenatal diagnosis is often difficult. The primary goals of prenatal evaluation are to differentiate lethal from non-lethal skeletal dysplasias, determine if diagnostic level testing is available " "prior to birth, determine optimal delivery timing and location, formulate post-delivery management plans, and determine recurrence risks for future pregnancies.     Non-Lethal Skeletal Dysplasia  Based on today's ultrasound findings, a non-lethal skeletal dysplasia is suspected. Femur length/abdominal circumference ratio is > 0.16 (0.20) & chest circumference/abdominal circumference ratio is > 0.6 (0.85). No other findings on ultrasound evaluation or genetic testing are suggestive of a lethal skeletal dysplasia.    The patient was counseled that the phenotype can vary widely with non-lethal skeletal dysplasias. For example, achondroplasia is a condition characterized by short stature with normal lifespan and intelligence. More severe, non-lethal skeletal dysplasias can be associated with severe chronic medical co-morbidities, abnormal neurologic development, and shortened lifespan. Findings today are particularly concerning for osteogenesis imperfecta, commonly known as "brittle bone disease." Osteogenesis imperfecta results from any number of mutations in genes that code for type I collagen. OI has four to five classically described types that range widely in phenotype, though at least 17 types have been described. Types I & IV are generally diagnosed after birth due to repeated fractures. Types II & III often present with prenatally diagnosed fetal fractures on US. Type II, also known as  OI, is characterized by undermineralization (evidenced by beading appearance of ribs and absence of sonographic shadowing observed with long bones and the skull) is universally associated fetal or  death. Type III, known as progressively deforming, can likewise be diagnosed by  US by presence of femoral bowing with or without shortening of the long bones. The latter can have similar US findings to Type II as early as 16-18 weeks gestation. While images of both the R tibia and lumbothoracic spine are " suggestive of fractures, today's imaging is not conclusive. Bowed femurs in the absence of fetal fractures can be associated with a range of ominous diagnoses, including thanatophoric dysplasia, camptomelic dysplasia, atelosteogenesis, achondrogenesis and homozygous achondroplasia, though other findings today make these diagnoses less likely.     Management:  Aneuploidy screening: declined  Referral to genetic counselor placed today  Counseled on diagnostic testing in the form of amniocentesis. Discussed risks and benefits of amniocentesis which include 1:900 risk of pregnancy loss, PPROM, labor progression, infection, or /previable delivery. Benefits would include a more clearly refined diagnosis. In particular, if a life-limiting genetic condition is diagnosed, this would aid in planning around interventions such as  fetal surveillance, as well as timing, mode and location of delivery. Pt would like an amnio. This will be coordinated in coming weeks at [facility].  Recommend: FISH with reflex to chromosomal microarray & [facility] prenatal skeletal dysplasia panel  Counseled that genetic testing may provide information regarding prognosis, as well as recurrence risks for other offspring and family members. Likewise counseled regarding possibility that CMA and skeletal dysplasia panel identify no genetic cause of the findings.  Fetal echocardiogram was ordered today  Will perform serial ultrasound assessments every 4 - 6 weeks  Delivery timing and location individualized based on diagnostic work up and type of suspected skeletal dysplasia

## 2023-11-29 NOTE — ASSESSMENT & PLAN NOTE
Previous methamphetamine use, discontinued during first pregnancy  Reports no current illicit substance use, though consumes a fifth of vodka daily  Has attempted to self-discontinue but symptoms intolerable within 2-3 days of abstinence    Today discussed cessation of use but warned against further attempts at stopping cold turkey given risk for potentially fatal withdrawal sequelae. Recommend supervised cessation under the care of Addiction Medicine specialists. I previously worked with [facility] for management of pregnant women with addiction, number given today.    Recommendations:  1. Pt to reach out to [facility] Dianna at [phone]

## 2023-11-29 NOTE — ASSESSMENT & PLAN NOTE
Diagnosed at age 28  Baseline HCV (23): 6.99 log     In general, women chronically infected with hepatitis C have an uneventful pregnancy. In HIV-negative patients, the risk of vertical transmission is approximately 5%. The risk of vertical transmission is directly correlated to viral load at time of delivery; it increases in the presence of HIV co-infection. Pregnancy complications can include fetal growth restriction and low birthweight. Treatment with antiviral medications is not currently recommended during pregnancy, though clinical trials are enrolling.     Recommendations:  1. Laboratory assessment (to be coordinated by primary OB):  - Hep A antibody screen to determine need for vaccination  2. Routine hepatitis care (primary OB to coordinate)  - Refer to hepatology  - If non-immune, vaccinate against hepatitis A & B  - Judicious use of medications with hepatotoxic properties, including acetaminophen  3. Third trimester care  - Serial growth assessments (every 4-6 weeks) to screen for growth restriction  - Repeat STI screening  4. Intrapartum management  - Hepatitis C infection is not an indication for elective   - Avoid (if possible) or minimize use of early amniotomy, fetal scalp electrode, and operative vaginal delivery  5. Postpartum  - Breastfeeding is not contraindicated (recommend abstain from breastfeeding if nipples are bleeding or cracked)  - Evaluation of  by pediatricians after delivery

## 2023-11-30 ENCOUNTER — PATIENT MESSAGE (OUTPATIENT)
Dept: PEDIATRIC CARDIOLOGY | Facility: CLINIC | Age: 33
End: 2023-11-30
Payer: MEDICAID

## 2023-11-30 ENCOUNTER — TELEPHONE (OUTPATIENT)
Dept: PEDIATRIC CARDIOLOGY | Facility: CLINIC | Age: 33
End: 2023-11-30
Payer: MEDICAID

## 2023-11-30 NOTE — PROGRESS NOTES
"MATERNAL-FETAL MEDICINE   CONSULT NOTE    Provider requesting consultation: Dr. Greenwood    SUBJECTIVE:     Ms. Mackenzie Montalvo is a 33 y.o.  female with IUP at 26w1d who is seen in consultation by MFM for evaluation and management of:  Problem   Pregnancy Complicated By Fetal Skeletal Dysplasia, Single Gestation   Alcohol Use Affecting Pregnancy in Second Trimester   Tobacco Use   Chronic Hepatitis C Virus Infection            Medication List with Changes/Refills   Current Medications    ONDANSETRON (ZOFRAN-ODT) 4 MG TBDL    dissolve 1 tablet (4 mg total) by mouth every 6 (six) hours as needed (nausea/vomiting).    PNV,CALCIUM 72/IRON/FOLIC ACID (PRENATAL VITAMIN) TAB    Take 1 tablet by mouth once daily.       Review of patient's allergies indicates:  No Known Allergies    PMH:History reviewed. No pertinent past medical history.    PObHx:  OB History    Para Term  AB Living   2 1 1     1   SAB IAB Ectopic Multiple Live Births           1      # Outcome Date GA Lbr Alan/2nd Weight Sex Delivery Anes PTL Lv   2 Current            1 Term 21 42w0d  3.345 kg (7 lb 6 oz) F Vag-Spont EPI N GARETH       PSH:History reviewed. No pertinent surgical history.    Family history:family history includes Diabetes in her mother; Venous thrombosis in her maternal grandmother.    Social history: reports that she has been smoking cigarettes. She started smoking about 16 years ago. She has a 16.9 pack-year smoking history. She has never used smokeless tobacco. She reports that she does not currently use alcohol after a past usage of about 112.0 standard drinks of alcohol per week. She reports that she does not currently use drugs after having used the following drugs: IV and Methamphetamines.    Genetic history: The patient denies any inherited genetic diseases or birth defects in herself or her partner's personal history or family.    Objective:   /80 (BP Location: Left arm)   Ht 5' 6" (1.676 m)   Wt 77.1 " kg (169 lb 15.6 oz)   LMP 2023 (Approximate)   BMI 27.43 kg/m²     Ultrasound performed. See viewpoint for full ultrasound report.  1. A detailed fetal anatomic ultrasound examination was performed for the following high risk indication: Alcohol use during pregnancy   2. 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  - Left femur appears bowed, as does the R humerus. Most compellingly, the R tibia appears angled sharply, suggestive of fracture. Shadowing from long bones and skull does not strongly suggest hypomineralization.  - Femur to abdominal circumference ratio is 0.20, above the threshold of 0.16 associated with high likelihood of lethality.  - Chest to abdominal circumference ratio is 0.85, above the threshold of 0.60 associated with high likelihood of lethality.  - Chest shape appears normal without beading of ribs.  - Skull shape is normal. Intracranial shadowing speaks against hypomineralization.   - Profile view appears normal without frontal bossing.  - Spine is notable for scoliosis/kyphotic angle secondary to fracture vs distal thoracic hemivertebrae, best viewed on sagittal cine #78 and coronal cine #104.  - Otherwise no fetal structural malformations are identified; however, fetal imaging is incomplete today with limited p fossa, lips/nose, RVOT and spine views. A follow-up study will be scheduled to complete the fetal anatomic survey.   3. Fetal size today is consistent with established gestational age, measuring with EFW at 10% despite shortened long bones.  4. Placental location is posterior without evidence of previa.  5. Amniotic fluid volume appears to be a normal amount.     Significant labs/imaging:  Chromosomal screening: none    ASSESSMENT/PLAN:     33 y.o.  female with IUP at 26w1d     Chronic hepatitis C virus infection  Diagnosed at age 28  Baseline HCV (23): 6.99 log     In general,  women chronically infected with hepatitis C have an uneventful pregnancy. In HIV-negative patients, the risk of vertical transmission is approximately 5%. The risk of vertical transmission is directly correlated to viral load at time of delivery; it increases in the presence of HIV co-infection. Pregnancy complications can include fetal growth restriction and low birthweight. Treatment with antiviral medications is not currently recommended during pregnancy, though clinical trials are enrolling.     Recommendations:  1. Laboratory assessment (to be coordinated by primary OB):  - Hep A antibody screen to determine need for vaccination  2. Routine hepatitis care (primary OB to coordinate)  - Refer to hepatology  - If non-immune, vaccinate against hepatitis A & B  - Judicious use of medications with hepatotoxic properties, including acetaminophen  3. Third trimester care  - Serial growth assessments (every 4-6 weeks) to screen for growth restriction  - Repeat STI screening  4. Intrapartum management  - Hepatitis C infection is not an indication for elective   - Avoid (if possible) or minimize use of early amniotomy, fetal scalp electrode, and operative vaginal delivery  5. Postpartum  - Breastfeeding is not contraindicated (recommend abstain from breastfeeding if nipples are bleeding or cracked)  - Evaluation of  by pediatricians after delivery    Pregnancy complicated by fetal skeletal dysplasia, single gestation  Fetal Skeletal Dysplasia  Today's ultrasound findings are suggestive of a fetal skeletal dysplasia with all of the long bones measuring 3-5 standard deviations below the mean, with bowed L femur and sharply bowed R tibia. A detailed fetal anatomic survey was performed suggesting kyphosis/scoliosis vs a hemivertebrae without specific additional findings. Pregnancy dating information was reviewed and confirmed; dates today are based [place] 17w [place] [facility] [place] [employer].  "    Maternal medical and family histories were reviewed, as was paternal family history. She reports constitutional short stature on her father's side, with his mother being 4'7". She has limited knowledge of her FOB's family history. Her exposure history is remarkable for daily alcohol use, consuming a fifth of vodka daily. No other contributory factors were identified.    Skeletal dysplasias are a heterogenous group of relatively rare conditions involving generalized abnormal bone growth, with a prevalence of approximately 2.4 per 10,000 births. In general, the earlier the gestational age at which skeletal findings are noted, the more likely the findings are to be associated with a severe or lethal skeletal dysplasia. Other genetic conditions or common aneuploidies, maternal medication/teratogen exposures, and maternal medical illnesses can be associated with shortened long bones or skeletal abnormalities. Some skeletal abnormalities are heritable. Neither maternal nor paternal family histories are suggestive of a heritable skeletal dysplasia. The patient has not had cfDNA testing or other fetal aneuploidy screening.     Due to the rarity of most skeletal dysplasias, variable phenotype, and limited assessment available prior to birth, prenatal diagnosis is often difficult. The primary goals of prenatal evaluation are to differentiate lethal from non-lethal skeletal dysplasias, determine if diagnostic level testing is available prior to birth, determine optimal delivery timing and location, formulate post-delivery management plans, and determine recurrence risks for future pregnancies.     Non-Lethal Skeletal Dysplasia  Based on today's ultrasound findings, a non-lethal skeletal dysplasia is suspected. Femur length/abdominal circumference ratio is > 0.16 (0.20) & chest circumference/abdominal circumference ratio is > 0.6 (0.85). No other findings on ultrasound evaluation or genetic testing are suggestive of a " "lethal skeletal dysplasia.    The patient was counseled that the phenotype can vary widely with non-lethal skeletal dysplasias. For example, achondroplasia is a condition characterized by short stature with normal lifespan and intelligence. More severe, non-lethal skeletal dysplasias can be associated with severe chronic medical co-morbidities, abnormal neurologic development, and shortened lifespan. Findings today are particularly concerning for osteogenesis imperfecta, commonly known as "brittle bone disease." Osteogenesis imperfecta results from any number of mutations in genes that code for type I collagen. OI has four to five classically described types that range widely in phenotype, though at least 17 types have been described. Types I & IV are generally diagnosed after birth due to repeated fractures. Types II & III often present with prenatally diagnosed fetal fractures on US. Type II, also known as  OI, is characterized by undermineralization (evidenced by beading appearance of ribs and absence of sonographic shadowing observed with long bones and the skull) is universally associated fetal or  death. Type III, known as progressively deforming, can likewise be diagnosed by  US by presence of femoral bowing with or without shortening of the long bones. The latter can have similar US findings to Type II as early as 16-18 weeks gestation. While images of both the R tibia and lumbothoracic spine are suggestive of fractures, today's imaging is not conclusive. Bowed femurs in the absence of fetal fractures can be associated with a range of ominous diagnoses, including thanatophoric dysplasia, camptomelic dysplasia, atelosteogenesis, achondrogenesis and homozygous achondroplasia, though other findings today make these diagnoses less likely.     Management:  Aneuploidy screening: declined  Referral to genetic counselor placed today  Counseled on diagnostic testing in the form of " amniocentesis. Discussed risks and benefits of amniocentesis which include 1:900 risk of pregnancy loss, PPROM, labor progression, infection, or /previable delivery. Benefits would include a more clearly refined diagnosis. In particular, if a life-limiting genetic condition is diagnosed, this would aid in planning around interventions such as  fetal surveillance, as well as timing, mode and location of delivery. Pt would like an amnio. This will be coordinated in coming weeks at [facility].  Recommend: FISH with reflex to chromosomal microarray & [employer] prenatal skeletal dysplasia panel  Counseled that genetic testing may provide information regarding prognosis, as well as recurrence risks for other offspring and family members. Likewise counseled regarding possibility that CMA and skeletal dysplasia panel identify no genetic cause of the findings.  Fetal echocardiogram was ordered today  Will perform serial ultrasound assessments every 4 - 6 weeks  Delivery timing and location individualized based on diagnostic work up and type of suspected skeletal dysplasia    Alcohol use affecting pregnancy in second trimester  Previous methamphetamine use, discontinued during first pregnancy  Reports no current illicit substance use, though consumes a fifth of vodka daily  Has attempted to self-discontinue but symptoms intolerable within 2-3 days of abstinence    Today discussed cessation of use but warned against further attempts at stopping cold turkey given risk for potentially fatal withdrawal sequelae. Recommend supervised cessation under the care of Addiction Medicine specialists. I previously worked with [facility] for management of pregnant women with addiction, number given today.    Recommendations:  1. [facility] at [phone]    Tobacco use  Daily cigarette smoking since age 16, consistent with 17 pack year history, never attempted cessation  Today we discussed  cessation, patient very much in precontemplation stage of change (yet to contemplate cessation)  We reviewed that any standard strategy to stop smoking in pregnancy (including nicotine replacement with patch/gum or pharmacologic urge suppression with bupropion)  is safer than continued cigarette smoking    Recommendations:  1. Dr. Greenwood to revisit cessation at future PNV  2. If pt reconsiders, recommend referral to smoking cessation clinic      FOLLOW UP:   F/u in 1-2 weeks for US/MFM visit, amniocentesis at that time      120 minutes of total time spent on the encounter, which includes face to face time and non-face to face time preparing to see the patient (eg, review of tests), obtaining and/or reviewing separately obtained history, documenting clinical information in the electronic or other health record, independently interpreting results (not separately reported) and communicating results to the patient/family/caregiver, or care coordination (not separately reported).      Giacomo Martinez III  Maternal-Fetal Medicine    Electronically Signed by Giacomo Martinez III November 30, 2023

## 2023-11-30 NOTE — ASSESSMENT & PLAN NOTE
Daily cigarette smoking since age 16, consistent with 17 pack year history, never attempted cessation  Today we discussed cessation, patient very much in precontemplation stage of change (yet to contemplate cessation)  We reviewed that any standard strategy to stop smoking in pregnancy (including nicotine replacement with patch/gum or pharmacologic urge suppression with bupropion)  is safer than continued cigarette smoking    Recommendations:  1. Dr. Greenwood to revisit cessation at future PNV  2. If pt reconsiders, recommend referral to smoking cessation clinic

## 2023-12-01 ENCOUNTER — PATIENT MESSAGE (OUTPATIENT)
Dept: OBSTETRICS AND GYNECOLOGY | Facility: CLINIC | Age: 33
End: 2023-12-01
Payer: MEDICAID

## 2023-12-06 ENCOUNTER — TELEPHONE (OUTPATIENT)
Dept: PEDIATRIC CARDIOLOGY | Facility: CLINIC | Age: 33
End: 2023-12-06
Payer: MEDICAID

## 2023-12-06 ENCOUNTER — PATIENT MESSAGE (OUTPATIENT)
Dept: PEDIATRIC CARDIOLOGY | Facility: CLINIC | Age: 33
End: 2023-12-06
Payer: MEDICAID

## 2023-12-06 NOTE — TELEPHONE ENCOUNTER
Call placed to patient in an attempt to schedule her fetal echo. Call answered by voicemail recording. VM left requesting a return call to [phone] to schedule fetal echo. Another portal message was also sent.

## 2023-12-07 ENCOUNTER — TELEPHONE (OUTPATIENT)
Dept: PEDIATRIC CARDIOLOGY | Facility: CLINIC | Age: 33
End: 2023-12-07
Payer: MEDICAID

## 2023-12-07 NOTE — TELEPHONE ENCOUNTER
Attempted another call to patient to schedule her fetal echo. Call answered by voicemail. VM left requesting a return call to [phone] to schedule.

## 2023-12-13 ENCOUNTER — PATIENT MESSAGE (OUTPATIENT)
Dept: OTHER | Facility: OTHER | Age: 33
End: 2023-12-13
Payer: MEDICAID

## 2023-12-21 ENCOUNTER — OFFICE VISIT (OUTPATIENT)
Dept: PEDIATRIC CARDIOLOGY | Facility: CLINIC | Age: 33
End: 2023-12-21
Payer: MEDICAID

## 2023-12-21 ENCOUNTER — CLINICAL SUPPORT (OUTPATIENT)
Dept: PEDIATRIC CARDIOLOGY | Facility: CLINIC | Age: 33
End: 2023-12-21
Payer: MEDICAID

## 2023-12-21 VITALS
HEIGHT: 66 IN | HEART RATE: 107 BPM | OXYGEN SATURATION: 97 % | DIASTOLIC BLOOD PRESSURE: 85 MMHG | BODY MASS INDEX: 27 KG/M2 | WEIGHT: 168 LBS | SYSTOLIC BLOOD PRESSURE: 128 MMHG

## 2023-12-21 DIAGNOSIS — Z03.73 FETAL ANOMALY SUSPECTED BUT NOT FOUND: Primary | ICD-10-CM

## 2023-12-21 DIAGNOSIS — Z36.83 ENCOUNTER FOR FETAL SCREENING FOR CONGENITAL CARDIAC ABNORMALITIES: ICD-10-CM

## 2023-12-21 PROCEDURE — 3008F BODY MASS INDEX DOCD: CPT | Mod: CPTII,,, | Performed by: PEDIATRICS

## 2023-12-21 PROCEDURE — 3008F PR BODY MASS INDEX (BMI) DOCUMENTED: ICD-10-PCS | Mod: CPTII,,, | Performed by: PEDIATRICS

## 2023-12-21 PROCEDURE — 99203 PR OFFICE/OUTPT VISIT, NEW, LEVL III, 30-44 MIN: ICD-10-PCS | Mod: 25,S$PBB,, | Performed by: PEDIATRICS

## 2023-12-21 PROCEDURE — 99213 OFFICE O/P EST LOW 20 MIN: CPT | Mod: PBBFAC,25 | Performed by: PEDIATRICS

## 2023-12-21 PROCEDURE — 76825 ECHO EXAM OF FETAL HEART: CPT | Mod: PBBFAC | Performed by: PEDIATRICS

## 2023-12-21 PROCEDURE — 93325 DOPPLER ECHO COLOR FLOW MAPG: CPT | Mod: 26,S$PBB,, | Performed by: PEDIATRICS

## 2023-12-21 PROCEDURE — 3074F SYST BP LT 130 MM HG: CPT | Mod: CPTII,,, | Performed by: PEDIATRICS

## 2023-12-21 PROCEDURE — 99999 PR PBB SHADOW E&M-EST. PATIENT-LVL III: ICD-10-PCS | Mod: PBBFAC,,, | Performed by: PEDIATRICS

## 2023-12-21 PROCEDURE — 93325 PR DOPPLER COLOR FLOW VELOCITY MAP: ICD-10-PCS | Mod: 26,S$PBB,, | Performed by: PEDIATRICS

## 2023-12-21 PROCEDURE — 3079F DIAST BP 80-89 MM HG: CPT | Mod: CPTII,,, | Performed by: PEDIATRICS

## 2023-12-21 PROCEDURE — 76825 ECHO EXAM OF FETAL HEART: CPT | Mod: 26,S$PBB,, | Performed by: PEDIATRICS

## 2023-12-21 PROCEDURE — 99999 PR PBB SHADOW E&M-EST. PATIENT-LVL III: CPT | Mod: PBBFAC,,, | Performed by: PEDIATRICS

## 2023-12-21 PROCEDURE — 76827 PR  SO2 FETAL HEART DOPPLER: ICD-10-PCS | Mod: 26,S$PBB,, | Performed by: PEDIATRICS

## 2023-12-21 PROCEDURE — 1160F PR REVIEW ALL MEDS BY PRESCRIBER/CLIN PHARMACIST DOCUMENTED: ICD-10-PCS | Mod: CPTII,,, | Performed by: PEDIATRICS

## 2023-12-21 PROCEDURE — 76827 ECHO EXAM OF FETAL HEART: CPT | Mod: 26,S$PBB,, | Performed by: PEDIATRICS

## 2023-12-21 PROCEDURE — 76827 ECHO EXAM OF FETAL HEART: CPT | Mod: PBBFAC | Performed by: PEDIATRICS

## 2023-12-21 PROCEDURE — 1159F MED LIST DOCD IN RCRD: CPT | Mod: CPTII,,, | Performed by: PEDIATRICS

## 2023-12-21 PROCEDURE — 1159F PR MEDICATION LIST DOCUMENTED IN MEDICAL RECORD: ICD-10-PCS | Mod: CPTII,,, | Performed by: PEDIATRICS

## 2023-12-21 PROCEDURE — 99203 OFFICE O/P NEW LOW 30 MIN: CPT | Mod: 25,S$PBB,, | Performed by: PEDIATRICS

## 2023-12-21 PROCEDURE — 3079F PR MOST RECENT DIASTOLIC BLOOD PRESSURE 80-89 MM HG: ICD-10-PCS | Mod: CPTII,,, | Performed by: PEDIATRICS

## 2023-12-21 PROCEDURE — 3074F PR MOST RECENT SYSTOLIC BLOOD PRESSURE < 130 MM HG: ICD-10-PCS | Mod: CPTII,,, | Performed by: PEDIATRICS

## 2023-12-21 PROCEDURE — 93325 DOPPLER ECHO COLOR FLOW MAPG: CPT | Mod: PBBFAC | Performed by: PEDIATRICS

## 2023-12-21 PROCEDURE — 76825 PR  SO2 FETAL HEART: ICD-10-PCS | Mod: 26,S$PBB,, | Performed by: PEDIATRICS

## 2023-12-21 PROCEDURE — 1160F RVW MEDS BY RX/DR IN RCRD: CPT | Mod: CPTII,,, | Performed by: PEDIATRICS

## 2023-12-27 ENCOUNTER — PATIENT MESSAGE (OUTPATIENT)
Dept: OTHER | Facility: OTHER | Age: 33
End: 2023-12-27
Payer: MEDICAID

## 2023-12-27 ENCOUNTER — TELEPHONE (OUTPATIENT)
Dept: MATERNAL FETAL MEDICINE | Facility: CLINIC | Age: 33
End: 2023-12-27
Payer: MEDICAID

## 2023-12-27 NOTE — TELEPHONE ENCOUNTER
Left message with callback number. Called patient to see if she needed to reschedule her appointments with M.

## 2023-12-28 NOTE — PROGRESS NOTES
[facility] ([place]) Fetal Cardiology Clinic    Today, I had the pleasure of evaluating Mackenzie Montalvo who is now 33 y.o. and carrying her second pregnancy at 29 weeks gestation with an KAMI of 3/6/24. She was referred for evaluation of the fetal heart due a concern for congenital heart disease.      She is carrying a female fetus.    Obstetric History:    .  Her OB care is by Dr. Greenwood.  Her MFM care is by Dr. Martinez.    History reviewed. No pertinent past medical history.      Current Outpatient Medications:     ondansetron (ZOFRAN-ODT) 4 MG TbDL, dissolve 1 tablet (4 mg total) by mouth every 6 (six) hours as needed (nausea/vomiting). (Patient not taking: Reported on 2023), Disp: 60 tablet, Rfl: 2    PNV,calcium 72/iron/folic acid (PRENATAL VITAMIN) Tab, Take 1 tablet by mouth once daily. (Patient not taking: Reported on 2023), Disp: 30 tablet, Rfl: 11    Family History: Negative for congenital heart disease, early coronary artery disease, sudden unexplained death, connective tissues disorders, genetic syndromes, multiple miscarriages or other congenital anomalies.      FETAL ECHOCARDIOGRAM (summary):  Fetal echocardiogram at 29 1/7 weeks gestation for a concern for congenital heart disease. KAMI 3/6/24. Normally connected heart. Normal sinus rhythm. No ectopy or sustained arrhythmia demonstrated throughout the study. Normal fetal atrial and ductal level shunting. No ventricular level shunting. Normal atrioventricular and semilunar valve structure and function. Normal ductal and aortic arches. Normal biventricular size and systolic function. No pericardial effusion.   (Full report in electronic medical record)    Impression:  Single active female fetus at 29 wga.  Normal fetal echocardiogram.      Todays fetal echocardiogram is normal, within the limitations of fetal echocardiography.  I discussed with her that fetal echocardiography is insufficiently sensitive to rule out  all septal defects, anomalies of pulmonary and systemic veins, arch anomalies, and some valvar abnormalities, nor can it ensure that the ductus arteriosus and foramen ovale will spontaneously close.     Recommendations:  Location, timing, and mode of delivery will be determined by the obstetrical team.  She does not require further follow-up in the fetal echocardiography clinic, but I would be happy to see her again if additional questions or concerns arise.    Should there be any concerns about the baby's heart after birth, a post- echocardiogram and cardiology consultation are recommended.     The above information was discussed in detail including the use of diagrams, with 30 minutes of total face to face time, with greater than 50% with counseling and coordination of care.  The discussion of the diagnosis and treatment options is as described above.      Mihai Terrazas MD, MPH  Pediatric and Fetal Cardiology  [facility]   [address] [place] [address]    Office: [phone]  Cell: [phone]

## 2024-01-05 ENCOUNTER — PATIENT MESSAGE (OUTPATIENT)
Dept: OBSTETRICS AND GYNECOLOGY | Facility: CLINIC | Age: 34
End: 2024-01-05
Payer: MEDICAID

## 2024-01-10 ENCOUNTER — PATIENT MESSAGE (OUTPATIENT)
Dept: OTHER | Facility: OTHER | Age: 34
End: 2024-01-10
Payer: MEDICAID

## 2024-01-23 ENCOUNTER — TELEPHONE (OUTPATIENT)
Dept: OBSTETRICS AND GYNECOLOGY | Facility: CLINIC | Age: 34
End: 2024-01-23
Payer: MEDICAID

## 2024-01-23 NOTE — TELEPHONE ENCOUNTER
----- Message from [place] sent at 1/23/2024 11:31 AM CST -----  Type: Patient Call Back    Who called:pt     What is the request in detail:pt calling to find out if she be can induced today. Call pt     Can the clinic reply by [facility]?    Would the patient rather a call back or a response via [facility]? call    Best call back number:[phone] (home)       Additional Information:

## 2024-01-27 ENCOUNTER — HOSPITAL ENCOUNTER (OUTPATIENT)
Facility: HOSPITAL | Age: 34
Discharge: HOME | End: 2024-01-27
Attending: OBSTETRICS & GYNECOLOGY | Admitting: OBSTETRICS & GYNECOLOGY
Payer: MEDICAID

## 2024-01-27 VITALS
DIASTOLIC BLOOD PRESSURE: 67 MMHG | SYSTOLIC BLOOD PRESSURE: 128 MMHG | RESPIRATION RATE: 18 BRPM | HEART RATE: 108 BPM | OXYGEN SATURATION: 97 %

## 2024-01-27 DIAGNOSIS — Z34.90 PREGNANCY: ICD-10-CM

## 2024-01-27 LAB
BACTERIA #/AREA URNS HPF: ABNORMAL /HPF
BILIRUB UR QL STRIP: NEGATIVE
CLARITY UR: ABNORMAL
COLOR UR: YELLOW
GLUCOSE UR QL STRIP: NEGATIVE
GRAN CASTS #/AREA URNS LPF: 2 /LPF
HGB UR QL STRIP: NEGATIVE
KETONES UR QL STRIP: NEGATIVE
LEUKOCYTE ESTERASE UR QL STRIP: ABNORMAL
MICROSCOPIC COMMENT: ABNORMAL
NITRITE UR QL STRIP: NEGATIVE
PH UR STRIP: 7 [PH] (ref 5–8)
PROT UR QL STRIP: NEGATIVE
RBC #/AREA URNS HPF: 2 /HPF (ref 0–4)
SP GR UR STRIP: 1.01 (ref 1–1.03)
SQUAMOUS #/AREA URNS HPF: 9 /HPF
URN SPEC COLLECT METH UR: ABNORMAL
UROBILINOGEN UR STRIP-ACNC: NEGATIVE EU/DL
WBC #/AREA URNS HPF: 3 /HPF (ref 0–5)

## 2024-01-27 PROCEDURE — 81000 URINALYSIS NONAUTO W/SCOPE: CPT | Performed by: OBSTETRICS & GYNECOLOGY

## 2024-01-27 PROCEDURE — 59025 FETAL NON-STRESS TEST: CPT

## 2024-01-27 PROCEDURE — 99211 OFF/OP EST MAY X REQ PHY/QHP: CPT

## 2024-01-28 NOTE — DISCHARGE INSTRUCTIONS
Home Undelivered Discharge Instructions    After Discharge Orders:    No future appointments.        Current Discharge Medication List        CONTINUE these medications which have NOT CHANGED    Details   ondansetron (ZOFRAN-ODT) 4 MG TbDL dissolve 1 tablet (4 mg total) by mouth every 6 (six) hours as needed (nausea/vomiting).  Qty: 60 tablet, Refills: 2    Associated Diagnoses: Pregnancy confirmed by positive urine test      PNV,calcium 72/iron/folic acid (PRENATAL VITAMIN) Tab Take 1 tablet by mouth once daily.  Qty: 30 tablet, Refills: 11    Associated Diagnoses: Pregnancy confirmed by positive urine test                     Diet:  normal diet as tolerated    Rest: normal activity as tolerated    Other instructions: Do kick counts once a day on your baby. Choose the time of day your baby is most active. Get in a comfortable lying or sitting position and time how long it takes to feel 10 kicks, twists, turns, swishes, or rolls. Call your physician or midwife if there have not been 10 kicks in 1 hours    Call physician or midwife, return to Labor and Delivery, call 911, or go to the nearest Emergency Room if: increased leakage or fluid, contractions more than  5 per  30 minutes, decreased fetal movement, persistent low back pain or cramping, bleeding from vaginal area, difficulty urinating, pain with urination, difficulty breathing, new calf pain, persistent headache, or vision change

## 2024-01-28 NOTE — NURSING
Discharge instructions reviewed with pt. Encouraged pt to schedule follow up prenatal appts. Pt and sig other walked off unit without difficulty

## 2024-01-28 NOTE — NURSING
"Pt arrived to triage with c/o abdominal pain for the past 4 days. SVE 1/50/-4, unknown presenting part. Pt reports that she currently drinks a 5th of vodka per day, reports taking 1-2 shots of vodka every two to three hours. Currenly smoke a pack per day. Admits to RN that she took a klonopin two weeks ago that a friend gave her. Pt denies vag bleeding, LOF, positive fetal movement. PO hydration given. Pt states "I am past my due date, if you aren't going to take the baby today, then I want to leave" Pt educated on the risks of leaving AMA and delivery at 34weeks. Call light within reach, verbalized understanding of POC  "

## 2024-01-31 ENCOUNTER — PATIENT MESSAGE (OUTPATIENT)
Dept: OTHER | Facility: OTHER | Age: 34
End: 2024-01-31
Payer: MEDICAID

## 2024-02-07 ENCOUNTER — TELEPHONE (OUTPATIENT)
Dept: OBSTETRICS AND GYNECOLOGY | Facility: CLINIC | Age: 34
End: 2024-02-07
Payer: MEDICAID

## 2024-02-07 NOTE — TELEPHONE ENCOUNTER
----- Message from Lyndsey Greenwood MD sent at 2/7/2024  3:13 PM CST -----  Regarding: RE: Self  [phone]  Please advise her to go to [facility] for triage  With her baby's condition, it would be safer for her to deliver at [facility].    Lyndsey Greenwood   ----- Message -----  From: Nikunj Arrington MA  Sent: 2/7/2024   2:04 PM CST  To: Lyndsey Greenwood MD  Subject: FW: Self  [phone]                             ----- Message -----  From: Abhilash De La Cruz  Sent: 2/7/2024   1:31 PM CST  To: Krystyna Solorio Staff  Subject: Self  [phone]                               Type: Patient Call Back    Who called: Self     What is the request in detail: pt stated that she would like to get induced. Stated her due date was last month. Would like a call back.     Can the clinic reply by [facility]? No     Would the patient rather a call back or a response via [facility]? Call back     Best call back number: [phone]     Additional Information:    Thank you.

## 2024-02-22 ENCOUNTER — HOSPITAL ENCOUNTER (INPATIENT)
Facility: OTHER | Age: 34
LOS: 4 days | Discharge: HOME | End: 2024-02-26
Attending: OBSTETRICS & GYNECOLOGY | Admitting: OBSTETRICS & GYNECOLOGY
Payer: MEDICAID

## 2024-02-22 ENCOUNTER — TELEPHONE (OUTPATIENT)
Dept: OBSTETRICS AND GYNECOLOGY | Facility: CLINIC | Age: 34
End: 2024-02-22
Payer: MEDICAID

## 2024-02-22 DIAGNOSIS — O99.312 ALCOHOL USE AFFECTING PREGNANCY IN SECOND TRIMESTER: ICD-10-CM

## 2024-02-22 DIAGNOSIS — O35.8XX0 PREGNANCY COMPLICATED BY FETAL SKELETAL DYSPLASIA, SINGLE GESTATION: ICD-10-CM

## 2024-02-22 DIAGNOSIS — O35.8XX1: ICD-10-CM

## 2024-02-22 DIAGNOSIS — O35.8XX0 PREGNANCY AFFECTED BY SKELETAL DYSPLASIA OF FETUS, SINGLE OR UNSPECIFIED FETUS: ICD-10-CM

## 2024-02-22 DIAGNOSIS — Z98.891 S/P CESAREAN SECTION: Primary | ICD-10-CM

## 2024-02-22 DIAGNOSIS — R00.0 TACHYCARDIA: ICD-10-CM

## 2024-02-22 PROBLEM — F15.11 HISTORY OF METHAMPHETAMINE ABUSE: Status: ACTIVE | Noted: 2020-07-12

## 2024-02-22 PROBLEM — Q78.0 OSTEOGENESIS IMPERFECTA: Status: ACTIVE | Noted: 2024-02-22

## 2024-02-22 LAB
ABO + RH BLD: NORMAL
ALBUMIN SERPL BCP-MCNC: 3 G/DL (ref 3.5–5.2)
ALP SERPL-CCNC: 168 U/L (ref 55–135)
ALT SERPL W/O P-5'-P-CCNC: 26 U/L (ref 10–44)
AMPHET+METHAMPHET UR QL: NEGATIVE
ANION GAP SERPL CALC-SCNC: 15 MMOL/L (ref 8–16)
AST SERPL-CCNC: 41 U/L (ref 10–40)
BARBITURATES UR QL SCN>200 NG/ML: NEGATIVE
BASOPHILS # BLD AUTO: 0.05 K/UL (ref 0–0.2)
BASOPHILS NFR BLD: 0.5 % (ref 0–1.9)
BENZODIAZ UR QL SCN>200 NG/ML: NEGATIVE
BILIRUB SERPL-MCNC: 0.4 MG/DL (ref 0.1–1)
BLD GP AB SCN CELLS X3 SERPL QL: NORMAL
BUN SERPL-MCNC: 17 MG/DL (ref 6–20)
BZE UR QL SCN: NEGATIVE
CALCIUM SERPL-MCNC: 9.7 MG/DL (ref 8.7–10.5)
CANNABINOIDS UR QL SCN: NEGATIVE
CHLORIDE SERPL-SCNC: 103 MMOL/L (ref 95–110)
CO2 SERPL-SCNC: 22 MMOL/L (ref 23–29)
CREAT SERPL-MCNC: 0.7 MG/DL (ref 0.5–1.4)
CREAT UR-MCNC: 140.6 MG/DL (ref 15–325)
CREAT UR-MCNC: 140.6 MG/DL (ref 15–325)
DIFFERENTIAL METHOD BLD: ABNORMAL
EOSINOPHIL # BLD AUTO: 0.3 K/UL (ref 0–0.5)
EOSINOPHIL NFR BLD: 2.7 % (ref 0–8)
ERYTHROCYTE [DISTWIDTH] IN BLOOD BY AUTOMATED COUNT: 12.6 % (ref 11.5–14.5)
EST. GFR  (NO RACE VARIABLE): >60 ML/MIN/1.73 M^2
ETHANOL UR-MCNC: 99 MG/DL
GLUCOSE SERPL-MCNC: 82 MG/DL (ref 70–110)
HCT VFR BLD AUTO: 39.4 % (ref 37–48.5)
HGB BLD-MCNC: 13.7 G/DL (ref 12–16)
HIV 1+2 AB+HIV1 P24 AG SERPL QL IA: NEGATIVE
IMM GRANULOCYTES # BLD AUTO: 0.07 K/UL (ref 0–0.04)
IMM GRANULOCYTES NFR BLD AUTO: 0.7 % (ref 0–0.5)
INFLUENZA A, MOLECULAR: NEGATIVE
INFLUENZA B, MOLECULAR: NEGATIVE
LYMPHOCYTES # BLD AUTO: 1.5 K/UL (ref 1–4.8)
LYMPHOCYTES NFR BLD: 13.6 % (ref 18–48)
MCH RBC QN AUTO: 34.4 PG (ref 27–31)
MCHC RBC AUTO-ENTMCNC: 34.8 G/DL (ref 32–36)
MCV RBC AUTO: 99 FL (ref 82–98)
METHADONE UR QL SCN>300 NG/ML: NEGATIVE
MONOCYTES # BLD AUTO: 0.4 K/UL (ref 0.3–1)
MONOCYTES NFR BLD: 4.1 % (ref 4–15)
NEUTROPHILS # BLD AUTO: 8.4 K/UL (ref 1.8–7.7)
NEUTROPHILS NFR BLD: 78.4 % (ref 38–73)
NRBC BLD-RTO: 0 /100 WBC
OPIATES UR QL SCN: NEGATIVE
PCP UR QL SCN>25 NG/ML: NEGATIVE
PLATELET # BLD AUTO: 280 K/UL (ref 150–450)
PMV BLD AUTO: 9.6 FL (ref 9.2–12.9)
POTASSIUM SERPL-SCNC: 3.7 MMOL/L (ref 3.5–5.1)
PROT SERPL-MCNC: 7.2 G/DL (ref 6–8.4)
PROT UR-MCNC: 18 MG/DL (ref 0–15)
PROT/CREAT UR: 0.13 MG/G{CREAT} (ref 0–0.2)
RBC # BLD AUTO: 3.98 M/UL (ref 4–5.4)
SARS-COV-2 RDRP RESP QL NAA+PROBE: NEGATIVE
SODIUM SERPL-SCNC: 140 MMOL/L (ref 136–145)
SPECIMEN OUTDATE: NORMAL
SPECIMEN SOURCE: NORMAL
TOXICOLOGY INFORMATION: ABNORMAL
WBC # BLD AUTO: 10.74 K/UL (ref 3.9–12.7)

## 2024-02-22 PROCEDURE — 59025 FETAL NON-STRESS TEST: CPT

## 2024-02-22 PROCEDURE — 4A1HXCZ MONITORING OF PRODUCTS OF CONCEPTION, CARDIAC RATE, EXTERNAL APPROACH: ICD-10-PCS | Performed by: OBSTETRICS & GYNECOLOGY

## 2024-02-22 PROCEDURE — 86901 BLOOD TYPING SEROLOGIC RH(D): CPT

## 2024-02-22 PROCEDURE — 80307 DRUG TEST PRSMV CHEM ANLYZR: CPT | Performed by: OBSTETRICS & GYNECOLOGY

## 2024-02-22 PROCEDURE — 85025 COMPLETE CBC W/AUTO DIFF WBC: CPT

## 2024-02-22 PROCEDURE — 87502 INFLUENZA DNA AMP PROBE: CPT | Performed by: OBSTETRICS & GYNECOLOGY

## 2024-02-22 PROCEDURE — 99222 1ST HOSP IP/OBS MODERATE 55: CPT | Mod: ,,, | Performed by: OBSTETRICS & GYNECOLOGY

## 2024-02-22 PROCEDURE — 36415 COLL VENOUS BLD VENIPUNCTURE: CPT

## 2024-02-22 PROCEDURE — 11000001 HC ACUTE MED/SURG PRIVATE ROOM

## 2024-02-22 PROCEDURE — G0378 HOSPITAL OBSERVATION PER HR: HCPCS

## 2024-02-22 PROCEDURE — U0002 COVID-19 LAB TEST NON-CDC: HCPCS | Performed by: STUDENT IN AN ORGANIZED HEALTH CARE EDUCATION/TRAINING PROGRAM

## 2024-02-22 PROCEDURE — 99285 EMERGENCY DEPT VISIT HI MDM: CPT | Mod: 25

## 2024-02-22 PROCEDURE — 87147 CULTURE TYPE IMMUNOLOGIC: CPT

## 2024-02-22 PROCEDURE — 87522 HEPATITIS C REVRS TRNSCRPJ: CPT

## 2024-02-22 PROCEDURE — 99284 EMERGENCY DEPT VISIT MOD MDM: CPT | Mod: 25,,, | Performed by: OBSTETRICS & GYNECOLOGY

## 2024-02-22 PROCEDURE — 87081 CULTURE SCREEN ONLY: CPT

## 2024-02-22 PROCEDURE — 59025 FETAL NON-STRESS TEST: CPT | Mod: 26,,, | Performed by: OBSTETRICS & GYNECOLOGY

## 2024-02-22 PROCEDURE — 80053 COMPREHEN METABOLIC PANEL: CPT

## 2024-02-22 PROCEDURE — 87389 HIV-1 AG W/HIV-1&-2 AB AG IA: CPT

## 2024-02-22 PROCEDURE — 86592 SYPHILIS TEST NON-TREP QUAL: CPT

## 2024-02-22 PROCEDURE — 84156 ASSAY OF PROTEIN URINE: CPT | Performed by: OBSTETRICS & GYNECOLOGY

## 2024-02-22 RX ORDER — FOLIC ACID 1 MG/1
1 TABLET ORAL DAILY
Status: DISCONTINUED | OUTPATIENT
Start: 2024-02-23 | End: 2024-02-26 | Stop reason: HOSPADM

## 2024-02-22 RX ORDER — DIPHENHYDRAMINE HCL 25 MG
25 CAPSULE ORAL EVERY 4 HOURS PRN
Status: DISCONTINUED | OUTPATIENT
Start: 2024-02-22 | End: 2024-02-24

## 2024-02-22 RX ORDER — ACETAMINOPHEN 325 MG/1
650 TABLET ORAL EVERY 6 HOURS PRN
Status: DISCONTINUED | OUTPATIENT
Start: 2024-02-22 | End: 2024-02-24

## 2024-02-22 RX ORDER — DIAZEPAM 5 MG/1
5 TABLET ORAL EVERY 8 HOURS
Status: DISCONTINUED | OUTPATIENT
Start: 2024-02-22 | End: 2024-02-22

## 2024-02-22 RX ORDER — PRENATAL WITH FERROUS FUM AND FOLIC ACID 3080; 920; 120; 400; 22; 1.84; 3; 20; 10; 1; 12; 200; 27; 25; 2 [IU]/1; [IU]/1; MG/1; [IU]/1; MG/1; MG/1; MG/1; MG/1; MG/1; MG/1; UG/1; MG/1; MG/1; MG/1; MG/1
1 TABLET ORAL DAILY
Status: DISCONTINUED | OUTPATIENT
Start: 2024-02-23 | End: 2024-02-24

## 2024-02-22 RX ORDER — SODIUM CHLORIDE 0.9 % (FLUSH) 0.9 %
10 SYRINGE (ML) INJECTION
Status: DISCONTINUED | OUTPATIENT
Start: 2024-02-22 | End: 2024-02-26 | Stop reason: HOSPADM

## 2024-02-22 RX ORDER — IBUPROFEN 200 MG
1 TABLET ORAL DAILY
Status: DISCONTINUED | OUTPATIENT
Start: 2024-02-23 | End: 2024-02-26 | Stop reason: HOSPADM

## 2024-02-22 RX ORDER — DIAZEPAM 5 MG/1
5 TABLET ORAL EVERY 8 HOURS PRN
Status: DISCONTINUED | OUTPATIENT
Start: 2024-02-22 | End: 2024-02-26 | Stop reason: HOSPADM

## 2024-02-22 RX ORDER — LANOLIN ALCOHOL/MO/W.PET/CERES
100 CREAM (GRAM) TOPICAL DAILY
Status: DISCONTINUED | OUTPATIENT
Start: 2024-02-23 | End: 2024-02-26 | Stop reason: HOSPADM

## 2024-02-22 RX ORDER — ONDANSETRON 8 MG/1
8 TABLET, ORALLY DISINTEGRATING ORAL EVERY 8 HOURS PRN
Status: DISCONTINUED | OUTPATIENT
Start: 2024-02-22 | End: 2024-02-26 | Stop reason: HOSPADM

## 2024-02-22 RX ORDER — AMOXICILLIN 250 MG
1 CAPSULE ORAL NIGHTLY PRN
Status: DISCONTINUED | OUTPATIENT
Start: 2024-02-22 | End: 2024-02-26 | Stop reason: HOSPADM

## 2024-02-22 RX ORDER — SIMETHICONE 80 MG
1 TABLET,CHEWABLE ORAL EVERY 6 HOURS PRN
Status: DISCONTINUED | OUTPATIENT
Start: 2024-02-22 | End: 2024-02-23

## 2024-02-22 RX ORDER — DIPHENHYDRAMINE HYDROCHLORIDE 50 MG/ML
25 INJECTION INTRAMUSCULAR; INTRAVENOUS EVERY 4 HOURS PRN
Status: DISCONTINUED | OUTPATIENT
Start: 2024-02-22 | End: 2024-02-24

## 2024-02-22 NOTE — TELEPHONE ENCOUNTER
Spoke with pt in regards to her concerns about being overdue and not knowing what she is to do. I spoke with dr johnson and pt was told to go to [facility] ED and let them know that she is overdue. It is safer for the baby that she deliver there as they have the resources for the baby that we don't have her. She agreed. Thank you

## 2024-02-23 ENCOUNTER — ANESTHESIA (OUTPATIENT)
Dept: OBSTETRICS AND GYNECOLOGY | Facility: OTHER | Age: 34
End: 2024-02-23
Payer: MEDICAID

## 2024-02-23 ENCOUNTER — ANESTHESIA EVENT (OUTPATIENT)
Dept: OBSTETRICS AND GYNECOLOGY | Facility: OTHER | Age: 34
End: 2024-02-23
Payer: MEDICAID

## 2024-02-23 LAB
HCV RNA SERPL NAA+PROBE-LOG IU: 5.94 LOGIU/ML
HCV RNA SERPL QL NAA+PROBE: DETECTED
HCV RNA SPEC NAA+PROBE-ACNC: ABNORMAL IU/ML

## 2024-02-23 PROCEDURE — 58300 INSERT INTRAUTERINE DEVICE: CPT | Mod: ,,, | Performed by: OBSTETRICS & GYNECOLOGY

## 2024-02-23 PROCEDURE — 63600175 PHARM REV CODE 636 W HCPCS

## 2024-02-23 PROCEDURE — 59514 CESAREAN DELIVERY ONLY: CPT | Mod: AA,,, | Performed by: ANESTHESIOLOGY

## 2024-02-23 PROCEDURE — 63600175 PHARM REV CODE 636 W HCPCS: Performed by: STUDENT IN AN ORGANIZED HEALTH CARE EDUCATION/TRAINING PROGRAM

## 2024-02-23 PROCEDURE — 36004725 HC OB OR TIME LEV III - EA ADD 15 MIN: Performed by: OBSTETRICS & GYNECOLOGY

## 2024-02-23 PROCEDURE — 63600175 PHARM REV CODE 636 W HCPCS: Mod: JZ,JG | Performed by: STUDENT IN AN ORGANIZED HEALTH CARE EDUCATION/TRAINING PROGRAM

## 2024-02-23 PROCEDURE — 25000003 PHARM REV CODE 250

## 2024-02-23 PROCEDURE — 37000008 HC ANESTHESIA 1ST 15 MINUTES: Performed by: OBSTETRICS & GYNECOLOGY

## 2024-02-23 PROCEDURE — 25000003 PHARM REV CODE 250: Performed by: STUDENT IN AN ORGANIZED HEALTH CARE EDUCATION/TRAINING PROGRAM

## 2024-02-23 PROCEDURE — 25000003 PHARM REV CODE 250: Performed by: ANESTHESIOLOGY

## 2024-02-23 PROCEDURE — 88307 TISSUE EXAM BY PATHOLOGIST: CPT | Performed by: PATHOLOGY

## 2024-02-23 PROCEDURE — 88307 TISSUE EXAM BY PATHOLOGIST: CPT | Mod: 26,,, | Performed by: PATHOLOGY

## 2024-02-23 PROCEDURE — S4991 NICOTINE PATCH NONLEGEND: HCPCS | Performed by: STUDENT IN AN ORGANIZED HEALTH CARE EDUCATION/TRAINING PROGRAM

## 2024-02-23 PROCEDURE — 11000001 HC ACUTE MED/SURG PRIVATE ROOM

## 2024-02-23 PROCEDURE — 36004724 HC OB OR TIME LEV III - 1ST 15 MIN: Performed by: OBSTETRICS & GYNECOLOGY

## 2024-02-23 PROCEDURE — 37000009 HC ANESTHESIA EA ADD 15 MINS: Performed by: OBSTETRICS & GYNECOLOGY

## 2024-02-23 PROCEDURE — 63600175 PHARM REV CODE 636 W HCPCS: Performed by: ANESTHESIOLOGY

## 2024-02-23 PROCEDURE — 71000033 HC RECOVERY, INTIAL HOUR: Performed by: OBSTETRICS & GYNECOLOGY

## 2024-02-23 PROCEDURE — 51702 INSERT TEMP BLADDER CATH: CPT

## 2024-02-23 PROCEDURE — 71000039 HC RECOVERY, EACH ADD'L HOUR: Performed by: OBSTETRICS & GYNECOLOGY

## 2024-02-23 PROCEDURE — 0UH90HZ INSERTION OF CONTRACEPTIVE DEVICE INTO UTERUS, OPEN APPROACH: ICD-10-PCS | Performed by: OBSTETRICS & GYNECOLOGY

## 2024-02-23 PROCEDURE — 59514 CESAREAN DELIVERY ONLY: CPT | Mod: AT,,, | Performed by: OBSTETRICS & GYNECOLOGY

## 2024-02-23 RX ORDER — ONDANSETRON HYDROCHLORIDE 2 MG/ML
4 INJECTION, SOLUTION INTRAVENOUS EVERY 6 HOURS PRN
Status: DISCONTINUED | OUTPATIENT
Start: 2024-02-23 | End: 2024-02-26 | Stop reason: HOSPADM

## 2024-02-23 RX ORDER — FAMOTIDINE 10 MG/ML
INJECTION INTRAVENOUS
Status: COMPLETED
Start: 2024-02-23 | End: 2024-02-23

## 2024-02-23 RX ORDER — AMOXICILLIN 250 MG
1 CAPSULE ORAL NIGHTLY PRN
Status: DISCONTINUED | OUTPATIENT
Start: 2024-02-23 | End: 2024-02-26 | Stop reason: HOSPADM

## 2024-02-23 RX ORDER — FENTANYL CITRATE 50 UG/ML
INJECTION, SOLUTION INTRAMUSCULAR; INTRAVENOUS
Status: DISCONTINUED | OUTPATIENT
Start: 2024-02-23 | End: 2024-02-23

## 2024-02-23 RX ORDER — DIPHENHYDRAMINE HYDROCHLORIDE 50 MG/ML
12.5 INJECTION INTRAMUSCULAR; INTRAVENOUS
Status: DISCONTINUED | OUTPATIENT
Start: 2024-02-23 | End: 2024-02-26 | Stop reason: HOSPADM

## 2024-02-23 RX ORDER — SIMETHICONE 80 MG
1 TABLET,CHEWABLE ORAL EVERY 6 HOURS PRN
Status: DISCONTINUED | OUTPATIENT
Start: 2024-02-23 | End: 2024-02-26 | Stop reason: HOSPADM

## 2024-02-23 RX ORDER — SODIUM CHLORIDE, SODIUM LACTATE, POTASSIUM CHLORIDE, CALCIUM CHLORIDE 600; 310; 30; 20 MG/100ML; MG/100ML; MG/100ML; MG/100ML
INJECTION, SOLUTION INTRAVENOUS CONTINUOUS PRN
Status: DISCONTINUED | OUTPATIENT
Start: 2024-02-23 | End: 2024-02-23

## 2024-02-23 RX ORDER — AZITHROMYCIN 100 MG/ML
INJECTION, POWDER, LYOPHILIZED, FOR SOLUTION INTRAVENOUS
Status: DISPENSED
Start: 2024-02-23 | End: 2024-02-23

## 2024-02-23 RX ORDER — DEXAMETHASONE SODIUM PHOSPHATE 4 MG/ML
INJECTION, SOLUTION INTRA-ARTICULAR; INTRALESIONAL; INTRAMUSCULAR; INTRAVENOUS; SOFT TISSUE
Status: DISCONTINUED | OUTPATIENT
Start: 2024-02-23 | End: 2024-02-23

## 2024-02-23 RX ORDER — CEFAZOLIN SODIUM 1 G/3ML
INJECTION, POWDER, FOR SOLUTION INTRAMUSCULAR; INTRAVENOUS
Status: DISCONTINUED | OUTPATIENT
Start: 2024-02-23 | End: 2024-02-23

## 2024-02-23 RX ORDER — HYDROCORTISONE 25 MG/G
CREAM TOPICAL 3 TIMES DAILY PRN
Status: DISCONTINUED | OUTPATIENT
Start: 2024-02-23 | End: 2024-02-26 | Stop reason: HOSPADM

## 2024-02-23 RX ORDER — OXYCODONE HYDROCHLORIDE 10 MG/1
10 TABLET ORAL EVERY 4 HOURS PRN
Status: DISCONTINUED | OUTPATIENT
Start: 2024-02-23 | End: 2024-02-26 | Stop reason: HOSPADM

## 2024-02-23 RX ORDER — ACETAMINOPHEN 500 MG
1000 TABLET ORAL
Status: DISCONTINUED | OUTPATIENT
Start: 2024-02-23 | End: 2024-02-26

## 2024-02-23 RX ORDER — OXYCODONE HYDROCHLORIDE 5 MG/1
5 TABLET ORAL EVERY 4 HOURS PRN
Status: DISCONTINUED | OUTPATIENT
Start: 2024-02-23 | End: 2024-02-26 | Stop reason: HOSPADM

## 2024-02-23 RX ORDER — ACETAMINOPHEN 325 MG/1
650 TABLET ORAL
Status: DISCONTINUED | OUTPATIENT
Start: 2024-02-23 | End: 2024-02-26 | Stop reason: HOSPADM

## 2024-02-23 RX ORDER — SODIUM CITRATE AND CITRIC ACID MONOHYDRATE 334; 500 MG/5ML; MG/5ML
SOLUTION ORAL
Status: DISPENSED
Start: 2024-02-23 | End: 2024-02-23

## 2024-02-23 RX ORDER — SODIUM CITRATE AND CITRIC ACID MONOHYDRATE 334; 500 MG/5ML; MG/5ML
30 SOLUTION ORAL
Status: DISCONTINUED | OUTPATIENT
Start: 2024-02-23 | End: 2024-02-26 | Stop reason: HOSPADM

## 2024-02-23 RX ORDER — ONDANSETRON 8 MG/1
8 TABLET, ORALLY DISINTEGRATING ORAL EVERY 8 HOURS PRN
Status: DISCONTINUED | OUTPATIENT
Start: 2024-02-23 | End: 2024-02-26 | Stop reason: HOSPADM

## 2024-02-23 RX ORDER — DOCUSATE SODIUM 100 MG/1
200 CAPSULE, LIQUID FILLED ORAL 2 TIMES DAILY
Status: DISCONTINUED | OUTPATIENT
Start: 2024-02-23 | End: 2024-02-26 | Stop reason: HOSPADM

## 2024-02-23 RX ORDER — METHYLERGONOVINE MALEATE 0.2 MG/ML
200 INJECTION INTRAVENOUS ONCE AS NEEDED
Status: DISCONTINUED | OUTPATIENT
Start: 2024-02-23 | End: 2024-02-26 | Stop reason: HOSPADM

## 2024-02-23 RX ORDER — PROCHLORPERAZINE EDISYLATE 5 MG/ML
5 INJECTION INTRAMUSCULAR; INTRAVENOUS EVERY 6 HOURS PRN
Status: DISCONTINUED | OUTPATIENT
Start: 2024-02-23 | End: 2024-02-26 | Stop reason: HOSPADM

## 2024-02-23 RX ORDER — IBUPROFEN 400 MG/1
800 TABLET ORAL
Status: DISCONTINUED | OUTPATIENT
Start: 2024-02-24 | End: 2024-02-26 | Stop reason: HOSPADM

## 2024-02-23 RX ORDER — SODIUM CITRATE AND CITRIC ACID MONOHYDRATE 334; 500 MG/5ML; MG/5ML
SOLUTION ORAL
Status: COMPLETED
Start: 2024-02-23 | End: 2024-02-23

## 2024-02-23 RX ORDER — DEXMEDETOMIDINE HYDROCHLORIDE 100 UG/ML
INJECTION, SOLUTION INTRAVENOUS
Status: DISCONTINUED | OUTPATIENT
Start: 2024-02-23 | End: 2024-02-23

## 2024-02-23 RX ORDER — FAMOTIDINE 10 MG/ML
20 INJECTION INTRAVENOUS
Status: DISCONTINUED | OUTPATIENT
Start: 2024-02-23 | End: 2024-02-25

## 2024-02-23 RX ORDER — PRENATAL WITH FERROUS FUM AND FOLIC ACID 3080; 920; 120; 400; 22; 1.84; 3; 20; 10; 1; 12; 200; 27; 25; 2 [IU]/1; [IU]/1; MG/1; [IU]/1; MG/1; MG/1; MG/1; MG/1; MG/1; MG/1; UG/1; MG/1; MG/1; MG/1; MG/1
1 TABLET ORAL DAILY
Status: DISCONTINUED | OUTPATIENT
Start: 2024-02-23 | End: 2024-02-26 | Stop reason: HOSPADM

## 2024-02-23 RX ORDER — MISOPROSTOL 200 UG/1
800 TABLET ORAL ONCE AS NEEDED
Status: DISCONTINUED | OUTPATIENT
Start: 2024-02-23 | End: 2024-02-26 | Stop reason: HOSPADM

## 2024-02-23 RX ORDER — TRANEXAMIC ACID 10 MG/ML
1000 INJECTION, SOLUTION INTRAVENOUS EVERY 30 MIN PRN
Status: DISCONTINUED | OUTPATIENT
Start: 2024-02-23 | End: 2024-02-26 | Stop reason: HOSPADM

## 2024-02-23 RX ORDER — MORPHINE SULFATE 0.5 MG/ML
INJECTION, SOLUTION EPIDURAL; INTRATHECAL; INTRAVENOUS
Status: DISCONTINUED | OUTPATIENT
Start: 2024-02-23 | End: 2024-02-23

## 2024-02-23 RX ORDER — ONDANSETRON HYDROCHLORIDE 2 MG/ML
INJECTION, SOLUTION INTRAMUSCULAR; INTRAVENOUS
Status: DISCONTINUED | OUTPATIENT
Start: 2024-02-23 | End: 2024-02-23

## 2024-02-23 RX ORDER — MIDAZOLAM HYDROCHLORIDE 1 MG/ML
INJECTION INTRAMUSCULAR; INTRAVENOUS
Status: DISCONTINUED | OUTPATIENT
Start: 2024-02-23 | End: 2024-02-23

## 2024-02-23 RX ORDER — ACETAMINOPHEN 10 MG/ML
INJECTION, SOLUTION INTRAVENOUS
Status: DISCONTINUED | OUTPATIENT
Start: 2024-02-23 | End: 2024-02-23

## 2024-02-23 RX ORDER — OXYTOCIN/RINGER'S LACTATE 30/500 ML
95 PLASTIC BAG, INJECTION (ML) INTRAVENOUS CONTINUOUS
Status: DISCONTINUED | OUTPATIENT
Start: 2024-02-23 | End: 2024-02-26

## 2024-02-23 RX ORDER — KETOROLAC TROMETHAMINE 30 MG/ML
30 INJECTION, SOLUTION INTRAMUSCULAR; INTRAVENOUS
Status: COMPLETED | OUTPATIENT
Start: 2024-02-23 | End: 2024-02-24

## 2024-02-23 RX ORDER — SODIUM CHLORIDE, SODIUM LACTATE, POTASSIUM CHLORIDE, CALCIUM CHLORIDE 600; 310; 30; 20 MG/100ML; MG/100ML; MG/100ML; MG/100ML
INJECTION, SOLUTION INTRAVENOUS CONTINUOUS
Status: DISCONTINUED | OUTPATIENT
Start: 2024-02-23 | End: 2024-02-24

## 2024-02-23 RX ORDER — CARBOPROST TROMETHAMINE 250 UG/ML
250 INJECTION, SOLUTION INTRAMUSCULAR
Status: DISCONTINUED | OUTPATIENT
Start: 2024-02-23 | End: 2024-02-26 | Stop reason: HOSPADM

## 2024-02-23 RX ORDER — NALBUPHINE HYDROCHLORIDE 10 MG/ML
5 INJECTION, SOLUTION INTRAMUSCULAR; INTRAVENOUS; SUBCUTANEOUS ONCE AS NEEDED
Status: DISCONTINUED | OUTPATIENT
Start: 2024-02-23 | End: 2024-02-26 | Stop reason: HOSPADM

## 2024-02-23 RX ORDER — BUPIVACAINE HYDROCHLORIDE 7.5 MG/ML
INJECTION, SOLUTION INTRASPINAL
Status: DISCONTINUED | OUTPATIENT
Start: 2024-02-23 | End: 2024-02-23

## 2024-02-23 RX ORDER — FAMOTIDINE 10 MG/ML
INJECTION INTRAVENOUS
Status: DISPENSED
Start: 2024-02-23 | End: 2024-02-23

## 2024-02-23 RX ORDER — DIPHENHYDRAMINE HCL 25 MG
25 CAPSULE ORAL EVERY 6 HOURS PRN
Status: DISCONTINUED | OUTPATIENT
Start: 2024-02-23 | End: 2024-02-26 | Stop reason: HOSPADM

## 2024-02-23 RX ADMIN — ONDANSETRON 4 MG: 2 INJECTION INTRAMUSCULAR; INTRAVENOUS at 05:02

## 2024-02-23 RX ADMIN — OXYCODONE HYDROCHLORIDE 10 MG: 10 TABLET ORAL at 12:02

## 2024-02-23 RX ADMIN — BUPIVACAINE HYDROCHLORIDE IN DEXTROSE 1.6 ML: 7.5 INJECTION, SOLUTION SUBARACHNOID at 06:02

## 2024-02-23 RX ADMIN — THERA TABS 1 TABLET: TAB at 11:02

## 2024-02-23 RX ADMIN — DIAZEPAM 5 MG: 5 TABLET ORAL at 02:02

## 2024-02-23 RX ADMIN — MORPHINE SULFATE 0.1 MG: 0.5 INJECTION, SOLUTION EPIDURAL; INTRATHECAL; INTRAVENOUS at 06:02

## 2024-02-23 RX ADMIN — ACETAMINOPHEN 650 MG: 325 TABLET, FILM COATED ORAL at 11:02

## 2024-02-23 RX ADMIN — DIAZEPAM 5 MG: 5 TABLET ORAL at 05:02

## 2024-02-23 RX ADMIN — DEXMEDETOMIDINE 8 MCG: 200 INJECTION, SOLUTION INTRAVENOUS at 06:02

## 2024-02-23 RX ADMIN — ACETAMINOPHEN 650 MG: 325 TABLET, FILM COATED ORAL at 06:02

## 2024-02-23 RX ADMIN — SODIUM CITRATE AND CITRIC ACID MONOHYDRATE 30 ML: 500; 334 SOLUTION ORAL at 05:02

## 2024-02-23 RX ADMIN — FAMOTIDINE 20 MG: 10 INJECTION, SOLUTION INTRAVENOUS at 05:02

## 2024-02-23 RX ADMIN — Medication 95 MILLI-UNITS/MIN: at 07:02

## 2024-02-23 RX ADMIN — MIDAZOLAM HYDROCHLORIDE 2 MG: 1 INJECTION, SOLUTION INTRAMUSCULAR; INTRAVENOUS at 06:02

## 2024-02-23 RX ADMIN — FOLIC ACID 1 MG: 1 TABLET ORAL at 11:02

## 2024-02-23 RX ADMIN — DEXAMETHASONE SODIUM PHOSPHATE 4 MG: 4 INJECTION INTRA-ARTICULAR; INTRALESIONAL; INTRAMUSCULAR; INTRAVENOUS; SOFT TISSUE at 06:02

## 2024-02-23 RX ADMIN — PHENYLEPHRINE HYDROCHLORIDE 0.5 MCG/KG/MIN: 10 INJECTION INTRAVENOUS at 06:02

## 2024-02-23 RX ADMIN — DIAZEPAM 5 MG: 5 TABLET ORAL at 10:02

## 2024-02-23 RX ADMIN — FENTANYL CITRATE 10 MCG: 0.05 INJECTION, SOLUTION INTRAMUSCULAR; INTRAVENOUS at 06:02

## 2024-02-23 RX ADMIN — OXYCODONE HYDROCHLORIDE 10 MG: 10 TABLET ORAL at 10:02

## 2024-02-23 RX ADMIN — ACETAMINOPHEN 1000 MG: 10 INJECTION INTRAVENOUS at 06:02

## 2024-02-23 RX ADMIN — SODIUM CHLORIDE, SODIUM LACTATE, POTASSIUM CHLORIDE, AND CALCIUM CHLORIDE: 600; 310; 30; 20 INJECTION, SOLUTION INTRAVENOUS at 05:02

## 2024-02-23 RX ADMIN — CEFAZOLIN 2 G: 330 INJECTION, POWDER, FOR SOLUTION INTRAMUSCULAR; INTRAVENOUS at 05:02

## 2024-02-23 RX ADMIN — MIDAZOLAM HYDROCHLORIDE 2 MG: 1 INJECTION, SOLUTION INTRAMUSCULAR; INTRAVENOUS at 05:02

## 2024-02-23 RX ADMIN — KETOROLAC TROMETHAMINE 30 MG: 30 INJECTION, SOLUTION INTRAMUSCULAR; INTRAVENOUS at 06:02

## 2024-02-23 RX ADMIN — Medication 100 MG: at 11:02

## 2024-02-23 RX ADMIN — SIMETHICONE 80 MG: 80 TABLET, CHEWABLE ORAL at 07:02

## 2024-02-23 RX ADMIN — OXYCODONE HYDROCHLORIDE 10 MG: 10 TABLET ORAL at 06:02

## 2024-02-23 RX ADMIN — Medication 1 PATCH: at 11:02

## 2024-02-23 RX ADMIN — SODIUM CHLORIDE, POTASSIUM CHLORIDE, SODIUM LACTATE AND CALCIUM CHLORIDE: 600; 310; 30; 20 INJECTION, SOLUTION INTRAVENOUS at 04:02

## 2024-02-23 RX ADMIN — DEXTROSE 2 G: 50 INJECTION, SOLUTION INTRAVENOUS at 06:02

## 2024-02-23 RX ADMIN — KETOROLAC TROMETHAMINE 30 MG: 30 INJECTION, SOLUTION INTRAMUSCULAR; INTRAVENOUS at 07:02

## 2024-02-23 RX ADMIN — DOCUSATE SODIUM 200 MG: 100 CAPSULE, LIQUID FILLED ORAL at 07:02

## 2024-02-23 RX ADMIN — KETOROLAC TROMETHAMINE 30 MG: 30 INJECTION, SOLUTION INTRAMUSCULAR; INTRAVENOUS at 01:02

## 2024-02-23 NOTE — ASSESSMENT & PLAN NOTE
- Skeletal anomalies noted on anatomy scan  - declined aneuploidy screening this pregnancy   - was recommended to have amniocentesis for diagnosis and treatment guidance, however, did not complete this   - BSUS in [facility] on admission did not show any occult fractures, severe lag in long bones w/ FL measuring 26w2d   - Will obtain official MFM scan tomorrow to determine mode of delivery and delivery timing

## 2024-02-23 NOTE — ANESTHESIA PROCEDURE NOTES
Spinal    Diagnosis: IUP  Patient location during procedure: OR  Start time: 2/23/2024 5:54 AM  Timeout: 2/23/2024 5:53 AM  End time: 2/23/2024 6:00 AM    Staffing  Authorizing Provider: Dominique Qureshi MD  Performing Provider: Dominique Qureshi MD    Staffing  Performed by: Dominique Qureshi MD  Authorized by: Dominique Qureshi MD    Preanesthetic Checklist  Completed: patient identified, IV checked, site marked, risks and benefits discussed, surgical consent, monitors and equipment checked, pre-op evaluation and timeout performed  Spinal Block  Patient position: sitting  Prep: ChloraPrep  Patient monitoring: heart rate, cardiac monitor, continuous pulse ox and frequent blood pressure checks  Approach: midline  Location: L4-5  Injection technique: single shot  CSF Fluid: clear free-flowing CSF  Needle  Needle type: Quincke   Needle gauge: 25 G  Needle length: 3.5 in  Additional Documentation: incremental injection, negative aspiration for heme and no paresthesia on injection  Needle localization: anatomical landmarks  Assessment  Sensory level: T4   Dermatomal levels determined by pinch or prick  Ease of block: easy  Patient's tolerance of the procedure: comfortable throughout block

## 2024-02-23 NOTE — NURSING
CIWA-AR  SCALE=0    Nausea/vomiting= none/0  Tremor= no tremor/0  Paroxysmal sweats= no sweat visible (pt reports hot flashes)  Anxiety= no anxiety, at ease/0  Agitation= normal activity/0  Tactile disturbances=none/10  Auditory disturbances= not present/0  Visual disturbances= not present/0  Headache= not present/0  Orientation=oriented/0    will reassess every 8 hrs and prn

## 2024-02-23 NOTE — ASSESSMENT & PLAN NOTE
Reports history of methamphetamine use in first pregnancy, however, denies any history this pregnancy. Denies any other illicit substance use this pregnancy including IV drugs.

## 2024-02-23 NOTE — ASSESSMENT & PLAN NOTE
- Patient admits to daily alcohol consumption this pregnancy, approximately 1 pint of vodka daily   - Long standing history of alcohol use for many years, was consuming about a liter of alcohol daily prior to pregnancy   - Patient amenable to urine tox screen, collected on admission   - [facility] protocol   - folic acid/thiamine PO supplementation ordered   - CMP pending on admission

## 2024-02-23 NOTE — ED PROVIDER NOTES
"Encounter Date: 2024       History   No chief complaint on file.    ANGELICA Montalvo is a 33 y.o. [id number] at 38w1d presents because she is "overdue." Patient states her due date was yesterday and her primary OBGYN told her to come to [facility] for delivery.    This IUP is complicated by fetal skeletal anomaly (possible OI3), EtOH use, tobacco use, h/o methamphetamine use, limited PNC.  Patient denies contractions, denies vaginal bleeding, denies LOF.   Fetal Movement: normal.     Review of patient's allergies indicates:  No Known Allergies  Past Medical History:   Diagnosis Date    Hepatitis a without hepatic coma      No past surgical history on file.  Family History   Problem Relation Age of Onset    Diabetes Mother     Venous thrombosis Maternal Grandmother      Social History     Tobacco Use    Smoking status: Every Day     Current packs/day: 1.00     Average packs/day: 1 pack/day for 17.1 years (17.1 ttl pk-yrs)     Types: Cigarettes     Start date:     Smokeless tobacco: Never   Substance Use Topics    Alcohol use: Yes     Alcohol/week: 112.0 standard drinks of alcohol     Types: 112 Shots of liquor per week    Drug use: Not Currently     Types: IV, Methamphetamines     Review of Systems   Constitutional:  Negative for fever.   HENT:  Negative for sore throat.    Respiratory:  Negative for shortness of breath.    Cardiovascular:  Negative for chest pain.   Gastrointestinal:  Negative for nausea.   Genitourinary:  Negative for dysuria.   Musculoskeletal:  Negative for back pain.   Skin:  Negative for rash.   Neurological:  Negative for weakness.   Hematological:  Does not bruise/bleed easily.       Physical Exam     Initial Vitals   BP Pulse Resp Temp SpO2   24 1846 24 1841 24 1846 24 1846 24 1841   120/76 (!) 117 18 98.4 °F (36.9 °C) 96 %      MAP       --                Physical Exam    Nursing note and vitals reviewed.  Constitutional: She appears well-developed and " well-nourished.   HENT:   Head: Normocephalic and atraumatic.   Eyes: Conjunctivae and EOM are normal. Pupils are equal, round, and reactive to light.   Neck: Neck supple.   Normal range of motion.  Cardiovascular:  Normal rate.           Pulmonary/Chest: No respiratory distress.   Musculoskeletal:         General: Normal range of motion.      Cervical back: Normal range of motion and neck supple.     Neurological: She is alert and oriented to person, place, and time. She has normal strength and normal reflexes.   Skin: Skin is warm and dry.   Psychiatric: She has a normal mood and affect. Her behavior is normal. Judgment and thought content normal.     OB LABOR EXAM:                       Comments: deferred       ED Course   Obtain Fetal nonstress test (NST)    Date/Time: 2024 8:48 PM    Performed by: Hailee Ruiz MD  Authorized by: Veena Michaels MD    Nonstress Test:     Variability:  6-25 BPM    Decelerations:  None    Accelerations:  15 bpm    Baseline:  140    Contractions:  Not present  Biophysical Profile:     Fetal Breathin    Fetal Movement:  2    Fetal Tone:  2    Fluid Volume:  2    Nonstress Test:  2    Biophysical Profile Score  (of 8):  8    Biophysical Profile Score  (of 10):  10    Nonstress Test Interpretation: reactive      Overall Impression:  Reassuring    Labs Reviewed   CBC W/ AUTO DIFFERENTIAL - Abnormal; Notable for the following components:       Result Value    RBC 3.98 (*)     MCV 99 (*)     MCH 34.4 (*)     Immature Granulocytes 0.7 (*)     Gran # (ANC) 8.4 (*)     Immature Grans (Abs) 0.07 (*)     Gran % 78.4 (*)     Lymph % 13.6 (*)     All other components within normal limits    Narrative:     Release to patient->Immediate   COMPREHENSIVE METABOLIC PANEL - Abnormal; Notable for the following components:    CO2 22 (*)     Albumin 3.0 (*)     Alkaline Phosphatase 168 (*)     AST 41 (*)     All other components within normal limits    Narrative:     Release to  patient->Immediate   STREP B SCREEN, VAGINAL / RECTAL   HIV 1 / 2 ANTIBODY   RPR   HEPATITIS C RNA, QUANTITATIVE, PCR          Imaging Results    None          Medications   sodium chloride 0.9% flush 10 mL (has no administration in time range)   acetaminophen tablet 650 mg (has no administration in time range)   ondansetron disintegrating tablet 8 mg (has no administration in time range)   senna-docusate 8.6-50 mg per tablet 1 tablet (has no administration in time range)   simethicone chewable tablet 80 mg (has no administration in time range)   diphenhydrAMINE capsule 25 mg (has no administration in time range)   diphenhydrAMINE injection 25 mg (has no administration in time range)   prenatal vitamin oral tablet (has no administration in time range)   diazePAM tablet 5 mg (has no administration in time range)   thiamine tablet 100 mg (has no administration in time range)   folic acid tablet 1 mg (has no administration in time range)   nicotine 21 mg/24 hr 1 patch (has no administration in time range)     Medical Decision Making  VSS, isolated mild range BP, will obtain preeclampsia labs   NST RR   BSUS: fetal growth measureing 32w2d with severe long bone lag consistent with fetal skeletal dysplasia     Discussed with MFM on call given limited PNC and poor social situation in the setting of fetal skeletal anomalies, will place in observation overnight with plans for official MFM ultrasound in the AM.     Delivery, blood transfusion consents signed. Vertex by BSUS. Medicaid tubal consents also signed. See H&P for full details.     Amount and/or Complexity of Data Reviewed  Labs: ordered.    Risk  OTC drugs.  Prescription drug management.              Attending Attestation:   Physician Attestation Statement for Resident:  As the supervising MD   Physician Attestation Statement: I have personally seen and examined this patient.   I agree with the above history.  -:   As the supervising MD I agree with the above PE.      As the supervising MD I agree with the above treatment, course, plan, and disposition.   I was personally present during the critical portions of the procedure(s) performed by the resident and was immediately available in the ED to provide services and assistance as needed during the entire procedure.  I have reviewed and agree with the residents interpretation of the following: rhythm strips.                       - patient placed on observation on antepartum. Plan for MFM consult in am.    Elizabeth Rojas MD  Attending  Obstetrics and Gynecology                   Clinical Impression:  Final diagnoses:  [O35.8XX0] Pregnancy complicated by fetal skeletal dysplasia, single gestation          ED Disposition Condition    Observation                 Hailee Ruiz MD  Resident  02/22/24 2053       Elizabeth Rojas MD  02/22/24 2101

## 2024-02-23 NOTE — HPI
"Mackenzie Montalvo is a 33 y.o. [id number] at 38w1d presents due to concerns that she needs to be delivered for post-dates. She states "my due date was yesterday and I need to be delivered." Denies any labor complaints including contractions, vaginal bleeding, or LOF. Reports normal fetal movement. Having general discomforts of pregnancy and fatigue.     This pregnancy has been complicated by limited prenatal care and fetal skeletal anomaly with concern for possible OI type 3. She has not had a routine prenatal visit since 24 wga due to poor social situation and transportation issues. She was seen by our MFM at 26w1d for evaluation of fetal skeletal anomalies and was recommended to have a follow up ultrasound and amniocentesis, however, reports she did not have this done as no one ever called her to schedule it. She has been seen by pediatric cardiology at 29 wga and had normal fetal echo.     Also of note, this pregnancy is complicated by a complex social situation including daily EtOH use (1 pint of vodka daily), tobacco use (1 ppd), and history of methamphetamine use in her prior pregnancy, however, no use this pregnancy. She also has a history of chronic Hep C (last VL 2023 9.7 million).   "

## 2024-02-23 NOTE — H&P
"  [facility] - Antepartum  Obstetrics  History & Physical    Patient Name: Mackenzie Montalvo  MRN: [id number]  Admission Date: 2024  Primary Care Provider: Bela, Primary Doctor    Subjective:     Principal Problem:Pregnancy complicated by fetal skeletal dysplasia, single gestation    History of Present Illness:  Mackenzie Montalvo is a 33 y.o. [id number] at 38w1d presents due to concerns that she needs to be delivered for post-dates. She states "my due date was yesterday and I need to be delivered." Denies any labor complaints including contractions, vaginal bleeding, or LOF. Reports normal fetal movement. Having general discomforts of pregnancy and fatigue.     This pregnancy has been complicated by limited prenatal care and fetal skeletal anomaly with concern for possible OI type 3. She has not had a routine prenatal visit since 24 wga due to poor social situation and transportation issues. She was seen by our MFM at 26w1d for evaluation of fetal skeletal anomalies and was recommended to have a follow up ultrasound and amniocentesis, however, reports she did not have this done as no one ever called her to schedule it. She has been seen by pediatric cardiology at 29 wga and had normal fetal echo.     Also of note, this pregnancy is complicated by a complex social situation including daily EtOH use (1 pint of vodka daily), tobacco use (1 ppd), and history of methamphetamine use in her prior pregnancy, however, no use this pregnancy. She also has a history of chronic Hep C (last VL 2023 9.7 million).       OB History    Para Term  AB Living   2 1 1 0 0 1   SAB IAB Ectopic Multiple Live Births   0 0 0 0 1      # Outcome Date GA Lbr Alan/2nd Weight Sex Delivery Anes PTL Lv   2 Current            1 Term 21 42w0d  3.345 kg (7 lb 6 oz) F Vag-Spont EPI N GARETH     Past Medical History:   Diagnosis Date    Hepatitis a without hepatic coma      No past surgical history on file.    (Not in a hospital " admission)      Review of patient's allergies indicates:  No Known Allergies     Family History       Problem Relation (Age of Onset)    Diabetes Mother    Venous thrombosis Maternal Grandmother          Tobacco Use    Smoking status: Every Day     Current packs/day: 1.00     Average packs/day: 1 pack/day for 17.1 years (17.1 ttl pk-yrs)     Types: Cigarettes     Start date: 2007    Smokeless tobacco: Never   Substance and Sexual Activity    Alcohol use: Yes     Alcohol/week: 112.0 standard drinks of alcohol     Types: 112 Shots of liquor per week    Drug use: Not Currently     Types: IV, Methamphetamines    Sexual activity: Yes     Partners: Male     Birth control/protection: None     Review of Systems   Constitutional:  Negative for chills and fever.   Respiratory:  Negative for cough and shortness of breath.    Cardiovascular:  Negative for chest pain.   Gastrointestinal:  Negative for abdominal pain, nausea and vomiting.   Endocrine: Negative for diabetes.   Genitourinary:  Negative for vaginal bleeding.   Musculoskeletal:  Negative for back pain.   Neurological:  Negative for headaches.   Psychiatric/Behavioral:  Negative for depression.       Objective:     Vital Signs (Most Recent):  Temp: 98.4 °F (36.9 °C) (02/22/24 1846)  Pulse: (!) 111 (02/22/24 1924)  Resp: 18 (02/22/24 1846)  BP: 128/82 (02/22/24 1924)  SpO2: 96 % (02/22/24 1856) Vital Signs (24h Range):  Temp:  [98.4 °F (36.9 °C)] 98.4 °F (36.9 °C)  Pulse:  [101-121] 111  Resp:  [18] 18  SpO2:  [95 %-96 %] 96 %  BP: (120-140)/(76-86) 128/82        There is no height or weight on file to calculate BMI.    FHT: 140s Cat 1 (reassuring)  TOCO:  None      Physical Exam:   Constitutional: She is oriented to person, place, and time. She appears well-developed and well-nourished.    HENT:   Head: Normocephalic and atraumatic.    Eyes: EOM are normal.     Cardiovascular:  Normal rate.             Pulmonary/Chest: Effort normal.        Abdominal: Soft. There is  no abdominal tenderness.             Musculoskeletal: Normal range of motion.       Neurological: She is alert and oriented to person, place, and time.    Skin: Skin is warm and dry.    Psychiatric: She has a normal mood and affect. Her behavior is normal.        Cervix: deferred      Significant Labs:  Lab Results   Component Value Date    GROUPTRH O POS 08/15/2023    HEPBSAG Non-reactive 2023       CBC:   Recent Labs   Lab 24   WBC 10.74   RBC 3.98*   HGB 13.7   HCT 39.4      MCV 99*   MCH 34.4*   MCHC 34.8     I have personallly reviewed all pertinent lab results from the last 24 hours.  Assessment/Plan:     33 y.o. female  at 38w1d for:    * Pregnancy complicated by fetal skeletal dysplasia, single gestation  - Skeletal anomalies noted on anatomy scan  - declined aneuploidy screening this pregnancy   - was recommended to have amniocentesis for diagnosis and treatment guidance, however, did not complete this   - BSUS in [facility] on admission did not show any occult fractures, severe lag in long bones w/ FL measuring 26w2d   - Will obtain official M scan tomorrow to determine mode of delivery and delivery timing    Alcohol use affecting pregnancy in second trimester  - Patient admits to daily alcohol consumption this pregnancy, approximately 1 pint of vodka daily   - Long standing history of alcohol use for many years, was consuming about a liter of alcohol daily prior to pregnancy   - Patient amenable to urine tox screen, collected on admission   - [facility] protocol   - folic acid/thiamine PO supplementation ordered   - CMP pending on admission     Chronic hepatitis C virus infection  - Chronic Hep C, last VL 2023 was 9.7 million  - Repeat quant collected on admission     Tobacco use  - Current 1ppd smoker   - nicotine patch PRN     History of methamphetamine abuse  Reports history of methamphetamine use in first pregnancy, however, denies any history this pregnancy. Denies any other  illicit substance use this pregnancy including IV drugs.     Limited prenatal care  - Primary OB:   - Delivery consents, including vaginal, operative, and , and blood consents reviewed and signed at time of admission.   - Presentation: cephalic; If moving forward with delivery, will rescan to determine fetal pesentation   - Growth Ultrasound: BSUS in [facility] today with EFW 1968g, EGA 32w2d suggestive of FGR with severe long bone lag and FL measuring 26w2d. Will obtain official [facility] ultrasound tomorrow.   - Diet: Regular  - Monitoring: NST BID   - Labs: 1T labs reviewed, 3T labs collected on admission   - Aneuploidy Screening: none   - 1hr GTT: 119; No indication to continue trending    - GBS: collection on admission, will start PCN if proceeding with IOL in the setting of GBS unknown;   - Tdap: Not received   - Continue PNV  - Contraception: desires permanent sterilization, however, Medicaid tubal consents will not be mature at time of delivery (signed today 24). Considering ppIUD for interim contraception with interval tubal.           Hailee Ruiz MD  Obstetrics  [facility] - Antepartum

## 2024-02-23 NOTE — ASSESSMENT & PLAN NOTE
- Primary OB:   - Delivery consents, including vaginal, operative, and , and blood consents reviewed and signed at time of admission.   - Presentation: cephalic; If moving forward with delivery, will rescan to determine fetal pesentation   - Growth Ultrasound: BSUS in [facility] today with EFW 1968g, EGA 32w2d suggestive of FGR with severe long bone lag and FL measuring 26w2d. Will obtain official [facility] ultrasound tomorrow.   - Diet: Regular  - Monitoring: NST BID   - Labs: 1T labs reviewed, 3T labs collected on admission   - Aneuploidy Screening: none   - 1hr GTT: 119; No indication to continue trending    - GBS: collection on admission, will start PCN if proceeding with IOL in the setting of GBS unknown;   - Tdap: Not received   - Continue PNV  - Contraception: desires permanent sterilization, however, Medicaid tubal consents will not be mature at time of delivery (signed today 24). Considering ppIUD for interim contraception with interval tubal.

## 2024-02-23 NOTE — L&D DELIVERY NOTE
[facility] - Labor & Delivery   Section   Operative Note    SUMMARY      Section Operative Note  Procedure Date: 2024    Procedure: Primary  Section via  Pfannenstiel skin incision    Indications:  labor, concern for fetal osteogenesis imperfecta    Pre-operative Diagnosis:   IUP at 38 week 2 day pregnancy  limited PNC  Concern for fetal osteogenesis imperfecta  Hepatitis C  Alcoholism  Tobacco use  h/o meth use  elevated BP    Post-operative Diagnosis:   S/p pLTCS with IUD insertion  Same as above    Surgeon: Julie Jeansonne, MD     Assistants: Marzena Crowley MD PGY4  Betsy Bro MD PGY2    Anesthesia: Spinal anesthesia    Findings: Normal appearing ovaries, fallopian tubes, uterus, and placenta. IUD placed to fundus    Estimated Blood Loss:   430cc           Total IV Fluids: See anesthesia report     UOP: See anesthesia report    Specimens: single viable female infant with APGARS 6/9, Placenta which was sent to pathology                  Complications:  None; patient tolerated the procedure well.           Disposition: PACU - hemodynamically stable.           Condition: stable    Procedure Details     Indication for delivery: Patient found to be in labor with SVE 5cm and rupture of membranes. [facility] staff on call called to discuss mode of delivery. In the setting of unknown severity of fetal skeletal dysplasia with concern on prior u/s of fractures, recommend proceeding with urgent  delivery.   Discussed with patient that given limited information regarding fetal status due to limited prenatal care, can consider  delivery which may be less traumatic for delivery. Patient amenable to proceed with  delivery. I reaffirmed this with the patient as this had previously been discussed with Dr Ruiz.     The risks, benefits, complications, treatment options, and expected outcomes were discussed with the patient.  The patient concurred with the proposed plan, giving  informed consent.  The site of surgery properly noted/marked. The patient was taken to Operating Room, identified as Mackenzie Montalvo and the procedure verified as Primary  Delivery with IUD insertion. A Time Out was held and the above information confirmed.    After induction of anesthesia, the patient was prepped and draped in the usual sterile manner while placed in a dorsal supine position with a left lateral tilt.  A morataya catheter was also placed per nursing. Preoperative antibiotics ancef and azithromycin were administered and an allis test was performed yielding adequate anesthesia.  A Pfannenstiel incision was made and carried down through the subcutaneous tissue to the fascia. Fascial incision was made and extended transversely. The fascia was grasped with Kocher clamps and  from the underlying rectus tissue superiorly and inferiorly. The peritoneum was identified, found to be free of adherent bowel and entered bluntly. Peritoneal incision was extended longitudinally. The vesico-uterine peritoneum was identified and bladder blade was inserted. The vesico-uterine peritoneum was incised transversely and the bladder flap was bluntly freed from the lower uterine segment . The bladder blade was reinserted to keep the bladder out of the operative field. A low transverse uterine incision was made with knife and extended with. The amniotic sac was ruptured bluntly and the infant was noted to be in cephalic position. The head was brought to the incision and elevated out of the pelvis. GENTLE delivery performed, careful attention paid to minimize trauma to the fetus due to unknown skeletal disease. The patient delivered a single viable female infant without difficulty.  The infant was immediately handed to NICU for evaluation. After the umbilical cord was clamped and cut cord blood was obtained for evaluation. The placenta was removed intact and appeared normal and was sent to pathology. The uterus  was exteriorized. The uterine outline, tubes and ovaries appeared normal. IUD was placed to fundus. The uterine incision was closed with running locked sutures of 1-chromic. Hemostasis was observed. The uterus was returned to the abdominal cavity. Incision was reinspected and good hemostasis was noted. The abdominal cavity was mopped with a moist lap to remove clots. The muscle was inspected and hemostasis noted. The fascia was then reapproximated with running sutures of 1-PDS. The subcutaneous fat and skin was reapproximated with 2-Vicryl and 4-0 Monocryl respectively.    Instrument, sponge, and needle counts were correct prior the abdominal closure and at the conclusion of the case.      **This patient is a candidate for a Trial Of Labor After  Delivery**    Patient tolerated procedure well.     Marzena Crowley MD PGY-4  Obstetrics and Gynecology          Delivery Information for Supa Montalvo    Birth information:  YOB: 2023   Time of birth: 6:22 AM   Sex: female   Head Delivery Date/Time: 2024  6:22 AM   Delivery type: , Low Transverse   Gestational Age: 38w2d        Delivery Providers    Delivering clinician: Jeansonne, Julie R., MD   Provider Role    Marzena Crowley MD Resident    Betsy Bro MD Resident    Maude Hahn RN Circulator    Lucy Mahajan RN Circulator    Lucia Rodriguez Surgical Tech    Jovana Duarte MD Anesthesiologist    Matthieu Rae MD Anesthesiologist              Measurements    Weight:   Length:          Apgars    Living status:   Apgar Component Scores:  1 min.:  5 min.:  10 min.:  15 min.:  20 min.:    Skin color:         Heart rate:         Reflex irritability:         Muscle tone:         Respiratory effort:         Total:                  Operative Delivery    Forceps attempted?: No  Vacuum extractor attempted?: No         Shoulder Dystocia    Shoulder dystocia present?: No           Presentation    Presentation:  Vertex  Position: Occiput Anterior           Interventions/Resuscitation    Method: NICU Attended       Cord    Vessels: 3 vessels  Complications: None  Delayed Cord Clamping?: No  Cord Clamped Date/Time: 2024  6:22 AM  Cord Blood Disposition: Sent with Baby  Gases Sent?: No  Stem Cell Collection (by MD): No       Placenta    Placenta delivery date/time: 2024 0625  Placenta removal: Manual removal  Placenta appearance: Intact  Placenta disposition: Pathology           Labor Events:       labor:       Labor Onset Date/Time:         Dilation Complete Date/Time:         Start Pushing Date/Time:         Start Pushing Date/Time:       Rupture Date/Time: 24  05         Rupture type: SRM (Spontaneous Rupture)         Fluid Amount:       Fluid Color: Green               steroids:       Antibiotics given for GBS:       Induction:       Indications for induction:        Augmentation:       Indications for augmentation:       Labor complications:       Additional complications:          Cervical ripening:                     Delivery:      Episiotomy:       Indication for Episiotomy:       Perineal Lacerations:   Repaired:      Periurethral Laceration:   Repaired:     Labial Laceration:   Repaired:     Sulcus Laceration:   Repaired:     Vaginal Laceration:   Repaired:     Cervical Laceration:   Repaired:     Repair suture:       Repair # of packets:       Last Value - EBL - Nursing (mL):       Sum - EBL - Nursing (mL): 0     Last Value - EBL - Anesthesia (mL):      Calculated QBL (mL): 435      Running total QBL (mL): 435      Vaginal Sweep Performed:       Surgicount Correct:       Vaginal Packing:   Quantity:       Other providers:       Anesthesia    Method: Spinal          Details (if applicable):  Trial of Labor No    Categorization: Primary    Priority: Urgent   Indications for : Known/Suspected Fetal Anomaly   Incision Type: low transverse      Additional  information:  Forceps:    Vacuum:    Breech:    Observed anomalies    Other (Comments):

## 2024-02-23 NOTE — CARE UPDATE
Notified by nurse at 0520 that patient was feeling regular contractions x 15 minutes and requesting to be checked. Patient placed on the monitor and FHT category 1 with toco Q2 minutes. SVE 5/-2, BBOW.     Called [facility] staff on call to discuss mode of delivery. In the setting of unknown severity of fetal skeletal dysplasia with concern on prior u/s of fractures, recommend proceeding with urgent  delivery.     En route to L&D, SROM with thin meconium noted. FHR remains category 1.Discussed with patient that given limited information regarding fetal status due to limited prenatal care, can consider  delivery which may be less traumatic for delivery. Patient amenable to proceed with  delivery.     CN, Staff, Anesthesia, NICU team aware. To OR for urgent primary  delivery.     Hailee Ruiz MD   PGY-4, OB-GYN

## 2024-02-23 NOTE — HOSPITAL COURSE
02/22/2024 - admit for observation due and M ultrasound and consultation in the morning. NST on admission RR, BPP 8/8, BSUS in [facility] with concern for FGR and severe long bone lag (FL measuring 26 wga). Regular diet. CIWA protocol initiated due to history of EtOH use daily this pregnancy.

## 2024-02-23 NOTE — PLAN OF CARE
Patient has been screened for Social Work discharge planning needs. Based on documentation in medical record, pt with limited prenatal care due to social situation.  Pt will be seen by  for resources.      24 0856   OB SCREEN   Assessment Type Discharge Planning Assessment   Source of Information health record   Received Prenatal Care Yes  (Prematal care was limited)   Any indications/suspicions for None   Is this a teen pregnancy No   Is the baby in NICU No   Indication for adoption/[facility] Haven No   Indication for DME/post-acute needs No   HIV (+) No   Any congenital  disorders Yes  (fetal skeletal anomalies)   Fetal demise/ death No

## 2024-02-23 NOTE — NURSING
"@ 0510 RN to pt room to administer valium. Pt reports she started having more intense ctx that started approx. 20 minutes ago.  Pt reported she had a few mild ctx overnight, but these are much worse and reports feeling pressure in her bottom. EFM/[place] initiated. Dr. Michaels notified; MD to bedside.      @ 0574 MD to bedside for SVE: 5/90-2 with bulging bag; charge rn notified.     Re-entered pt room; pt sitting at edge of bed with n/v noted. Pt reports her "water broke" (@ 0530); greenish tinted fluid noted. Gown changed and Pt transported to rec 2 via wheelchair.     Dr. Michaels and charge and OR rn notified of [facility] status.       "

## 2024-02-23 NOTE — TRANSFER OF CARE
"Anesthesia Transfer of Care Note    Patient: Mackenzie Montalvo    Procedure(s) Performed: Procedure(s) (LRB):   SECTION (N/A)    Patient location: PACU    Anesthesia Type: spinal    Transport from OR: Transported from OR on room air with adequate spontaneous ventilation    Post pain: adequate analgesia    Post assessment: no apparent anesthetic complications and tolerated procedure well    Post vital signs: stable    Level of consciousness: awake, alert and oriented    Nausea/Vomiting: no nausea/vomiting    Complications: none    Transfer of care protocol was followed      Last vitals: Visit Vitals  /87 (BP Location: Left arm, Patient Position: Sitting)   Pulse 88   Temp 36.6 °C (97.8 °F) (Oral)   Resp 18   Ht 5' 5.98" (1.676 m)   Wt 76.2 kg (167 lb 15.9 oz)   LMP 2023 (Approximate)   SpO2 95%   Breastfeeding No   BMI 27.13 kg/m²     "

## 2024-02-23 NOTE — SUBJECTIVE & OBJECTIVE
OB History    Para Term  AB Living   2 1 1 0 0 1   SAB IAB Ectopic Multiple Live Births   0 0 0 0 1      # Outcome Date GA Lbr Alan/2nd Weight Sex Delivery Anes PTL Lv   2 Current            1 Term 21 42w0d  3.345 kg (7 lb 6 oz) F Vag-Spont EPI N GARETH     Past Medical History:   Diagnosis Date    Hepatitis a without hepatic coma      No past surgical history on file.    (Not in a hospital admission)      Review of patient's allergies indicates:  No Known Allergies     Family History       Problem Relation (Age of Onset)    Diabetes Mother    Venous thrombosis Maternal Grandmother          Tobacco Use    Smoking status: Every Day     Current packs/day: 1.00     Average packs/day: 1 pack/day for 17.1 years (17.1 ttl pk-yrs)     Types: Cigarettes     Start date:     Smokeless tobacco: Never   Substance and Sexual Activity    Alcohol use: Yes     Alcohol/week: 112.0 standard drinks of alcohol     Types: 112 Shots of liquor per week    Drug use: Not Currently     Types: IV, Methamphetamines    Sexual activity: Yes     Partners: Male     Birth control/protection: None     Review of Systems   Constitutional:  Negative for chills and fever.   Respiratory:  Negative for cough and shortness of breath.    Cardiovascular:  Negative for chest pain.   Gastrointestinal:  Negative for abdominal pain, nausea and vomiting.   Endocrine: Negative for diabetes.   Genitourinary:  Negative for vaginal bleeding.   Musculoskeletal:  Negative for back pain.   Neurological:  Negative for headaches.   Psychiatric/Behavioral:  Negative for depression.       Objective:     Vital Signs (Most Recent):  Temp: 98.4 °F (36.9 °C) (24)  Pulse: (!) 111 (24)  Resp: 18 (24)  BP: 128/82 (24)  SpO2: 96 % (24) Vital Signs (24h Range):  Temp:  [98.4 °F (36.9 °C)] 98.4 °F (36.9 °C)  Pulse:  [101-121] 111  Resp:  [18] 18  SpO2:  [95 %-96 %] 96 %  BP: (120-140)/(76-86) 128/82         There is no height or weight on file to calculate BMI.    FHT: 140s Cat 1 (reassuring)  TOCO:  None      Physical Exam:   Constitutional: She is oriented to person, place, and time. She appears well-developed and well-nourished.    HENT:   Head: Normocephalic and atraumatic.    Eyes: EOM are normal.     Cardiovascular:  Normal rate.             Pulmonary/Chest: Effort normal.        Abdominal: Soft. There is no abdominal tenderness.             Musculoskeletal: Normal range of motion.       Neurological: She is alert and oriented to person, place, and time.    Skin: Skin is warm and dry.    Psychiatric: She has a normal mood and affect. Her behavior is normal.        Cervix: deferred      Significant Labs:  Lab Results   Component Value Date    GROUPTRH O POS 08/15/2023    HEPBSAG Non-reactive 11/20/2023       CBC:   Recent Labs   Lab 02/22/24 1957   WBC 10.74   RBC 3.98*   HGB 13.7   HCT 39.4      MCV 99*   MCH 34.4*   MCHC 34.8     I have personallly reviewed all pertinent lab results from the last 24 hours.

## 2024-02-23 NOTE — NURSING
CIWA-AR  SCALE=2     Nausea/vomiting= none/0  Tremor= no tremor/0  Paroxysmal sweats= no sweat visible (pt reports hot flashes)  Anxiety= mild anxiety/1  Agitation= normal activity/0  Tactile disturbances=mild itching/1  Auditory disturbances= not present/0  Visual disturbances= not present/0  Headache= not present/0  Orientation=oriented/0        Pt state she is feeling mildly nervous/anxious and reports itching; pt appears slightly restless; pt requesting diazepam. Dr. Michaels notified; ok to give valium at this time.

## 2024-02-24 LAB
ALBUMIN SERPL BCP-MCNC: 2.5 G/DL (ref 3.5–5.2)
ALP SERPL-CCNC: 124 U/L (ref 55–135)
ALT SERPL W/O P-5'-P-CCNC: 20 U/L (ref 10–44)
ANION GAP SERPL CALC-SCNC: 9 MMOL/L (ref 8–16)
AST SERPL-CCNC: 29 U/L (ref 10–40)
BASOPHILS # BLD AUTO: 0.08 K/UL (ref 0–0.2)
BASOPHILS NFR BLD: 0.7 % (ref 0–1.9)
BILIRUB SERPL-MCNC: 0.4 MG/DL (ref 0.1–1)
BUN SERPL-MCNC: 27 MG/DL (ref 6–20)
CALCIUM SERPL-MCNC: 8.4 MG/DL (ref 8.7–10.5)
CHLORIDE SERPL-SCNC: 103 MMOL/L (ref 95–110)
CO2 SERPL-SCNC: 22 MMOL/L (ref 23–29)
CREAT SERPL-MCNC: 0.9 MG/DL (ref 0.5–1.4)
DIFFERENTIAL METHOD BLD: ABNORMAL
EOSINOPHIL # BLD AUTO: 0.4 K/UL (ref 0–0.5)
EOSINOPHIL NFR BLD: 3.2 % (ref 0–8)
ERYTHROCYTE [DISTWIDTH] IN BLOOD BY AUTOMATED COUNT: 12.7 % (ref 11.5–14.5)
EST. GFR  (NO RACE VARIABLE): >60 ML/MIN/1.73 M^2
GLUCOSE SERPL-MCNC: 70 MG/DL (ref 70–110)
HCT VFR BLD AUTO: 27.5 % (ref 37–48.5)
HGB BLD-MCNC: 9.3 G/DL (ref 12–16)
IMM GRANULOCYTES # BLD AUTO: 0.12 K/UL (ref 0–0.04)
IMM GRANULOCYTES NFR BLD AUTO: 1 % (ref 0–0.5)
LYMPHOCYTES # BLD AUTO: 2.1 K/UL (ref 1–4.8)
LYMPHOCYTES NFR BLD: 18.1 % (ref 18–48)
MAGNESIUM SERPL-MCNC: 1.5 MG/DL (ref 1.6–2.6)
MCH RBC QN AUTO: 34.7 PG (ref 27–31)
MCHC RBC AUTO-ENTMCNC: 33.8 G/DL (ref 32–36)
MCV RBC AUTO: 103 FL (ref 82–98)
MONOCYTES # BLD AUTO: 0.7 K/UL (ref 0.3–1)
MONOCYTES NFR BLD: 5.8 % (ref 4–15)
NEUTROPHILS # BLD AUTO: 8.4 K/UL (ref 1.8–7.7)
NEUTROPHILS NFR BLD: 71.2 % (ref 38–73)
NRBC BLD-RTO: 0 /100 WBC
PHOSPHATE SERPL-MCNC: 4 MG/DL (ref 2.7–4.5)
PLATELET # BLD AUTO: 239 K/UL (ref 150–450)
PMV BLD AUTO: 10 FL (ref 9.2–12.9)
POTASSIUM SERPL-SCNC: 4.4 MMOL/L (ref 3.5–5.1)
PROT SERPL-MCNC: 5.6 G/DL (ref 6–8.4)
RBC # BLD AUTO: 2.68 M/UL (ref 4–5.4)
RPR SER QL: NORMAL
SODIUM SERPL-SCNC: 134 MMOL/L (ref 136–145)
WBC # BLD AUTO: 11.74 K/UL (ref 3.9–12.7)

## 2024-02-24 PROCEDURE — 93010 ELECTROCARDIOGRAM REPORT: CPT | Mod: ,,, | Performed by: INTERNAL MEDICINE

## 2024-02-24 PROCEDURE — 83735 ASSAY OF MAGNESIUM: CPT | Performed by: OBSTETRICS & GYNECOLOGY

## 2024-02-24 PROCEDURE — 93005 ELECTROCARDIOGRAM TRACING: CPT

## 2024-02-24 PROCEDURE — 11000001 HC ACUTE MED/SURG PRIVATE ROOM

## 2024-02-24 PROCEDURE — 93041 RHYTHM ECG TRACING: CPT

## 2024-02-24 PROCEDURE — 99233 SBSQ HOSP IP/OBS HIGH 50: CPT | Mod: ,,, | Performed by: OBSTETRICS & GYNECOLOGY

## 2024-02-24 PROCEDURE — 63600175 PHARM REV CODE 636 W HCPCS

## 2024-02-24 PROCEDURE — 80053 COMPREHEN METABOLIC PANEL: CPT | Performed by: OBSTETRICS & GYNECOLOGY

## 2024-02-24 PROCEDURE — 84100 ASSAY OF PHOSPHORUS: CPT | Performed by: OBSTETRICS & GYNECOLOGY

## 2024-02-24 PROCEDURE — 36415 COLL VENOUS BLD VENIPUNCTURE: CPT | Performed by: OBSTETRICS & GYNECOLOGY

## 2024-02-24 PROCEDURE — S4991 NICOTINE PATCH NONLEGEND: HCPCS | Performed by: STUDENT IN AN ORGANIZED HEALTH CARE EDUCATION/TRAINING PROGRAM

## 2024-02-24 PROCEDURE — 85025 COMPLETE CBC W/AUTO DIFF WBC: CPT | Performed by: OBSTETRICS & GYNECOLOGY

## 2024-02-24 PROCEDURE — 25000003 PHARM REV CODE 250: Performed by: STUDENT IN AN ORGANIZED HEALTH CARE EDUCATION/TRAINING PROGRAM

## 2024-02-24 PROCEDURE — 99900035 HC TECH TIME PER 15 MIN (STAT)

## 2024-02-24 PROCEDURE — 25000003 PHARM REV CODE 250

## 2024-02-24 RX ADMIN — ACETAMINOPHEN 650 MG: 325 TABLET, FILM COATED ORAL at 04:02

## 2024-02-24 RX ADMIN — ACETAMINOPHEN 650 MG: 325 TABLET, FILM COATED ORAL at 10:02

## 2024-02-24 RX ADMIN — FOLIC ACID 1 MG: 1 TABLET ORAL at 08:02

## 2024-02-24 RX ADMIN — OXYCODONE HYDROCHLORIDE 10 MG: 10 TABLET ORAL at 09:02

## 2024-02-24 RX ADMIN — OXYCODONE HYDROCHLORIDE 10 MG: 10 TABLET ORAL at 04:02

## 2024-02-24 RX ADMIN — DIAZEPAM 5 MG: 5 TABLET ORAL at 08:02

## 2024-02-24 RX ADMIN — THERA TABS 1 TABLET: TAB at 08:02

## 2024-02-24 RX ADMIN — PRENATAL VIT W/ FE FUMARATE-FA TAB 27-0.8 MG 1 TABLET: 27-0.8 TAB at 08:02

## 2024-02-24 RX ADMIN — OXYCODONE HYDROCHLORIDE 10 MG: 10 TABLET ORAL at 12:02

## 2024-02-24 RX ADMIN — OXYCODONE HYDROCHLORIDE 10 MG: 10 TABLET ORAL at 03:02

## 2024-02-24 RX ADMIN — DIAZEPAM 5 MG: 5 TABLET ORAL at 04:02

## 2024-02-24 RX ADMIN — DOCUSATE SODIUM 200 MG: 100 CAPSULE, LIQUID FILLED ORAL at 07:02

## 2024-02-24 RX ADMIN — Medication 1 PATCH: at 08:02

## 2024-02-24 RX ADMIN — KETOROLAC TROMETHAMINE 30 MG: 30 INJECTION, SOLUTION INTRAMUSCULAR; INTRAVENOUS at 04:02

## 2024-02-24 RX ADMIN — IBUPROFEN 800 MG: 400 TABLET ORAL at 08:02

## 2024-02-24 RX ADMIN — ACETAMINOPHEN 650 MG: 325 TABLET, FILM COATED ORAL at 09:02

## 2024-02-24 RX ADMIN — ACETAMINOPHEN 650 MG: 325 TABLET, FILM COATED ORAL at 03:02

## 2024-02-24 RX ADMIN — OXYCODONE HYDROCHLORIDE 10 MG: 10 TABLET ORAL at 08:02

## 2024-02-24 RX ADMIN — DOCUSATE SODIUM 200 MG: 100 CAPSULE, LIQUID FILLED ORAL at 08:02

## 2024-02-24 RX ADMIN — IBUPROFEN 800 MG: 400 TABLET ORAL at 04:02

## 2024-02-24 RX ADMIN — Medication 100 MG: at 08:02

## 2024-02-24 NOTE — CARE UPDATE
Phone call to SW on call; Janeen. Discussed patient case and active alcoholism, now post partum. Patient with minimal social support. SW recommended we call DCFS to report concern for abuse/neglect and they should evaluate patient prior to discharge from hospital. DCFS hotline called @ [phone]. Attempted phone call, however all lines busy. Discussed with pedatrics, plan for SW to see on Monday and contact DCFS.     Notified patient we recommend she stay inpatient until Monday for in person SW eval and DCFS clearance prior to DC. Patient disappointed she cannot leave tomorrow. Explained our role as mandated reporters. Patient declined psychiatric evaluation/addiction medicine referral. 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     Marisa Hussein MD  PGY 3  Obstetrics and Gynecology

## 2024-02-24 NOTE — PROGRESS NOTES
POSTPARTUM PROGRESS NOTE    Subjective:     PPD/POD#: 1   Procedure: Primary LTCS   EGA: 38w2d   N/V: No   F/C: No   Abd Pain: Mild, well-controlled with oral pain medication   Lochia: Mild   Voiding: Not yet spontaneously voided after morataya catheter removal   Ambulating: No   Bowel fnc: No   Contraception: Immediate post-placental Liletta IUD placed     Objective:      Temp:  [97.4 °F (36.3 °C)-98.5 °F (36.9 °C)] 97.4 °F (36.3 °C)  Pulse:  [] 91  Resp:  [16-18] 16  SpO2:  [93 %-100 %] 99 %  BP: ()/(47-76) 139/63    Abdomen: Soft, appropriately tender   Uterus: Firm, no fundal tenderness   Incision: Bandage in place without shadowing     Lab Review    Recent Labs   Lab 02/22/24 1957      K 3.7      CO2 22*   BUN 17   CREATININE 0.7   GLU 82   PROT 7.2   BILITOT 0.4   ALKPHOS 168*   ALT 26   AST 41*         Recent Labs   Lab 02/22/24 1957   WBC 10.74   HGB 13.7   HCT 39.4   MCV 99*              I/O    Intake/Output Summary (Last 24 hours) at 2/24/2024 0440  Last data filed at 2/24/2024 0345  Gross per 24 hour   Intake 1605.33 ml   Output 1385 ml   Net 220.33 ml          Assessment and Plan:   Postpartum care:  - Patient doing well.  - Continue routine management and advances.    Limited PNC  - Will obtain SW consult in the postpartum period    Alcoholism  - Patient reports drinking 2 pints of alcohol daily  - [facility] protocol   - Received Diazepam 3x yesterday    Tobacco use (1 PPD)  - Currently smoking 1 ppd  - Nicotine patches PRN    H/O Meth Use  - Denies any history this pregnancy. Denies any other illicit substance use this pregnancy including IV drugs.      Elevated BP x 1  - Asymptomatic   - Does not meet criteria for any anti-hypertensive diagnoses at this time    Mary Cope MD PGY-2  Obstetrics and Gynecology  [facility]    Fellow attestation. POD#1 s/p primary CD. Doing well without complaints. Pain well controlled. Denies flatus, eating and drinking  "without n/v. Complains of occasional "shakes", but denies severe agitation. Currently under CIWA protocol and has received 4 doses of valium.     Temp:  [97.4 °F (36.3 °C)-98.5 °F (36.9 °C)] 97.4 °F (36.3 °C)  Pulse:  [] 91  Resp:  [16-18] 17  SpO2:  [93 %-100 %] 99 %  BP: (119-139)/(63-76) 139/63    Abd: dressing in place, minimal drainage.     -Routine post-op/postpartum care  -Continue CIWA protocol to monitor for alcohol withdrawal symptoms, valium PRN  -Consultation with addiction medicine and social work today  -Labs this am within normal limits  -Continue to monitor inpatient at this time    Jw Yoo MD  PGY 7  Maternal Fetal Medicine  [facility]          "

## 2024-02-24 NOTE — PLAN OF CARE
Patient safety maintained, side rails up, bed low and locked position. Pt ambulating and voiding independently.  Pain well controlled with PRN pain medication. Fundus midline, firm, with light lochia. Patient responding to infant cues. Incision site dressed - dried drainage. CIWA scale utilized - medications for management of withdrawal. Pt HR elevated - EKG done. MD notified of results. Will continue to monitor.

## 2024-02-25 LAB
OHS QRS DURATION: 80 MS
OHS QTC CALCULATION: 471 MS

## 2024-02-25 PROCEDURE — 25000003 PHARM REV CODE 250

## 2024-02-25 PROCEDURE — 25000003 PHARM REV CODE 250: Performed by: STUDENT IN AN ORGANIZED HEALTH CARE EDUCATION/TRAINING PROGRAM

## 2024-02-25 PROCEDURE — 99232 SBSQ HOSP IP/OBS MODERATE 35: CPT | Mod: 95,,, | Performed by: OBSTETRICS & GYNECOLOGY

## 2024-02-25 PROCEDURE — S4991 NICOTINE PATCH NONLEGEND: HCPCS | Performed by: STUDENT IN AN ORGANIZED HEALTH CARE EDUCATION/TRAINING PROGRAM

## 2024-02-25 PROCEDURE — 11000001 HC ACUTE MED/SURG PRIVATE ROOM

## 2024-02-25 RX ORDER — LANOLIN ALCOHOL/MO/W.PET/CERES
1 CREAM (GRAM) TOPICAL DAILY
Status: DISCONTINUED | OUTPATIENT
Start: 2024-02-25 | End: 2024-02-26 | Stop reason: HOSPADM

## 2024-02-25 RX ADMIN — OXYCODONE HYDROCHLORIDE 10 MG: 10 TABLET ORAL at 12:02

## 2024-02-25 RX ADMIN — Medication 100 MG: at 08:02

## 2024-02-25 RX ADMIN — DIAZEPAM 5 MG: 5 TABLET ORAL at 01:02

## 2024-02-25 RX ADMIN — IBUPROFEN 800 MG: 400 TABLET ORAL at 09:02

## 2024-02-25 RX ADMIN — THERA TABS 1 TABLET: TAB at 08:02

## 2024-02-25 RX ADMIN — FERROUS SULFATE TAB 325 MG (65 MG ELEMENTAL FE) 1 EACH: 325 (65 FE) TAB at 08:02

## 2024-02-25 RX ADMIN — ACETAMINOPHEN 650 MG: 325 TABLET, FILM COATED ORAL at 05:02

## 2024-02-25 RX ADMIN — DIAZEPAM 5 MG: 5 TABLET ORAL at 06:02

## 2024-02-25 RX ADMIN — OXYCODONE HYDROCHLORIDE 10 MG: 10 TABLET ORAL at 08:02

## 2024-02-25 RX ADMIN — DOCUSATE SODIUM 200 MG: 100 CAPSULE, LIQUID FILLED ORAL at 08:02

## 2024-02-25 RX ADMIN — PRENATAL VIT W/ FE FUMARATE-FA TAB 27-0.8 MG 1 TABLET: 27-0.8 TAB at 08:02

## 2024-02-25 RX ADMIN — ACETAMINOPHEN 650 MG: 325 TABLET, FILM COATED ORAL at 06:02

## 2024-02-25 RX ADMIN — DIAZEPAM 5 MG: 5 TABLET ORAL at 09:02

## 2024-02-25 RX ADMIN — IBUPROFEN 800 MG: 400 TABLET ORAL at 01:02

## 2024-02-25 RX ADMIN — ACETAMINOPHEN 650 MG: 325 TABLET, FILM COATED ORAL at 11:02

## 2024-02-25 RX ADMIN — Medication 1 PATCH: at 08:02

## 2024-02-25 RX ADMIN — DOCUSATE SODIUM 200 MG: 100 CAPSULE, LIQUID FILLED ORAL at 07:02

## 2024-02-25 RX ADMIN — FOLIC ACID 1 MG: 1 TABLET ORAL at 08:02

## 2024-02-25 RX ADMIN — OXYCODONE HYDROCHLORIDE 10 MG: 10 TABLET ORAL at 04:02

## 2024-02-25 RX ADMIN — IBUPROFEN 800 MG: 400 TABLET ORAL at 06:02

## 2024-02-25 NOTE — NURSING
RN called to bedside by Dr. Rivera requesting RN to take infant due to increased agitation so mother could take a walk and smoke a cigarette. Dr. Rivera informed RN to let him know if any issues arise. Charge nurse informed of issue and talked to Dr. Rivera, told charge RN he gave pt the okay to take a walk due to her increased agitation despite her history of alcohol abuse and going through withdrawl. Pt on CIWA scale and going through withdrawal. IV still in place.  Security found pt on corner of [address] and brought her back to her room @0430. Pt's IV removed @0675.          Propranolol Pregnancy And Lactation Text: This medication is Pregnancy Category C and it isn't known if it is safe during pregnancy. It is excreted in breast milk.

## 2024-02-25 NOTE — PROGRESS NOTES
POSTPARTUM PROGRESS NOTE    Subjective:     PPD/POD#: 2   Procedure: Primary LTCS   EGA: 38w2d   N/V: No   F/C: No   Abd Pain: Mild, well-controlled with oral pain medication   Lochia: Mild   Voiding: Not yet spontaneously voided after morataya catheter removal   Ambulating: No   Bowel fnc: No   Contraception: Immediate post-placental Liletta IUD placed     Objective:      Temp:  [97.4 °F (36.3 °C)-98 °F (36.7 °C)] 97.9 °F (36.6 °C)  Pulse:  [] 95  Resp:  [16-18] 18  SpO2:  [95 %-100 %] 97 %  BP: (110-139)/(56-81) 120/56    Abdomen: Soft, appropriately tender   Uterus: Firm, no fundal tenderness   Incision: Bandage in place without shadowing     Lab Review    Recent Labs   Lab 02/22/24 1957 02/24/24  0615    134*   K 3.7 4.4    103   CO2 22* 22*   BUN 17 27*   CREATININE 0.7 0.9   GLU 82 70   PROT 7.2 5.6*   BILITOT 0.4 0.4   ALKPHOS 168* 124   ALT 26 20   AST 41* 29   MG  --  1.5*   PHOS  --  4.0       Recent Labs   Lab 02/22/24 1957 02/24/24  0821   WBC 10.74 11.74   HGB 13.7 9.3*   HCT 39.4 27.5*   MCV 99* 103*    239     I/O    Intake/Output Summary (Last 24 hours) at 2/25/2024 0237  Last data filed at 2/24/2024 0800  Gross per 24 hour   Intake --   Output 550 ml   Net -550 ml       Assessment and Plan:   Postpartum care:  - Patient doing well  - Continue routine management and advances  - Continue scheduled and PRN analgesia    Limited PNC  - SW to see Monday.  Discussed over the weekend with on-call SW; pt needs to been see by DCFS on Monday for home evaluation    Alcoholism  - Patient reports drinking 2 pints of alcohol daily  - CIWA protocol; scores < 3 throughout yesterday  - Received Diazepam 3x over last 24 hours    Tobacco use  - Reports smoking 1 ppd  - Nicotine patches PRN    H/O Meth Use  - Denies any history this pregnancy. Denies any other illicit substance use this pregnancy including IV drugs.      Elevated BP x 1  - Asymptomatic; normotensive throughout last 24 hours   -  Does not meet criteria for any anti-hypertensive diagnoses at this time      Luis Alfredo Rivera MD MS  OB/Gyn  PGY-1    Fellow attestation. POD#2 s/p primary CD. Doing well without complaints. Pain well controlled. Denies flatus, eating and drinking without n/v. Currently under CIWA protocol receiving valium PRN for agitation.     Temp:  [97.5 °F (36.4 °C)-98 °F (36.7 °C)] 97.5 °F (36.4 °C)  Pulse:  [] 99  Resp:  [17-18] 18  SpO2:  [95 %-100 %] 98 %  BP: (110-137)/(56-81) 124/76        -Routine post-op/postpartum care  -Continue CIWA protocol to monitor for alcohol withdrawal symptoms, valium PRN  -Has declined consultation with addiction medicine   -Discussed need for SW eval and DCFS clearance prior to discharge which we anticipate tomorrow  -Nursing staff to coordinate close monitoring of patient and  safety due to elopement overnight  -Continue to monitor inpatient at this time    Jw Yoo MD  PGY 7  Maternal Fetal Medicine  [facility]

## 2024-02-25 NOTE — NURSING
RN called to bedside by Dr. Rivera requesting RN to take infant due to increased agitation so mother could take a walk and smoke a cigarette. Dr. Rivera informed RN to let him know if any issues arise. Charge nurse informed of issue and talked to Dr. Rivera, told charge RN he gave pt the okay to take a walk due to her increased agitation despite her history of alcohol abuse and going through withdrawl. Pt on CIWA scale and going through withdrawal. IV still in place.  Security found pt on corner and brought her back to her room @0430.       **Agree with above.  RN called to room due to pt agitation and stating she was going to attempt to leave with child.  Pt told she could go for a walk in an effort deescalate situation and safely remove child to nursing station.  Discussed with pt ongoing DCFS case and active alcohol withdrawal and the need to continue current treatment plan.  Pt voiced understanding.  Pt returned to [facility] escorted by security.    Luis Alfredo Rivera MD MS  OB/Gyn  PGY-1

## 2024-02-25 NOTE — PLAN OF CARE
Patient safety maintained, side rails up, bed low and locked position. Pt ambulating and voiding independently. Pain well controlled with PRN pain medication. Fundus midline, firm, with light lochia. Patient responding to infant cues.  Incision site dressed - minimal dried drainage. CIWA scores 1-3 today - pt doing well. SW consult and telepsych pending. Will continue to monitor.

## 2024-02-25 NOTE — ANESTHESIA POSTPROCEDURE EVALUATION
Anesthesia Post Evaluation    Patient: Mackenzie Montalvo    Procedure(s) Performed: Procedure(s) (LRB):   SECTION (N/A)    Final Anesthesia Type: spinal      Patient location during evaluation: labor & delivery  Patient participation: Yes- Able to Participate  Level of consciousness: awake and alert  Post-procedure vital signs: reviewed and stable  Pain management: adequate  Airway patency: patent  KEILA mitigation strategies: Multimodal analgesia  PONV status at discharge: No PONV  Anesthetic complications: no      Cardiovascular status: stable and blood pressure returned to baseline  Respiratory status: unassisted, spontaneous ventilation and room air  Hydration status: euvolemic  Follow-up not needed.              Vitals Value Taken Time   /76 24 0820   Temp 36.4 °C (97.5 °F) 24 0820   Pulse 99 24 0820   Resp 18 24 0820   SpO2 98 % 24 0820         Event Time   Out of Recovery 2024 09:07:16         Pain/Dennys Score: Pain Rating Prior to Med Admin: 4 (2024 11:00 AM)  Pain Rating Post Med Admin: 4 (2024 10:00 AM)

## 2024-02-25 NOTE — PLAN OF CARE
VSS. Pain controlled with scheduled and prn oral pain medication. CIWA scores q4h. Formula feeding without RN assistance. Fundus firm and midline with light lochia rubra upon assessment. LTV dressing in place, dry/intact with a small amount of brown, dried drainage, no odor. Voiding spontaneously without difficulty and with adequate output. Unable to pass gas at this time. IV removed @0505. No concerns at this time. Will continue to monitor and intervene as necessary.

## 2024-02-26 VITALS
SYSTOLIC BLOOD PRESSURE: 135 MMHG | BODY MASS INDEX: 27 KG/M2 | WEIGHT: 168 LBS | DIASTOLIC BLOOD PRESSURE: 83 MMHG | RESPIRATION RATE: 18 BRPM | HEART RATE: 107 BPM | TEMPERATURE: 98 F | OXYGEN SATURATION: 100 % | HEIGHT: 66 IN

## 2024-02-26 PROBLEM — Z98.891 S/P CESAREAN SECTION: Status: ACTIVE | Noted: 2024-02-26

## 2024-02-26 LAB — BACTERIA SPEC AEROBE CULT: NORMAL

## 2024-02-26 PROCEDURE — 99238 HOSP IP/OBS DSCHRG MGMT 30/<: CPT | Mod: ,,, | Performed by: OBSTETRICS & GYNECOLOGY

## 2024-02-26 PROCEDURE — 3E02340 INTRODUCTION OF INFLUENZA VACCINE INTO MUSCLE, PERCUTANEOUS APPROACH: ICD-10-PCS | Performed by: OBSTETRICS & GYNECOLOGY

## 2024-02-26 PROCEDURE — S4991 NICOTINE PATCH NONLEGEND: HCPCS | Performed by: STUDENT IN AN ORGANIZED HEALTH CARE EDUCATION/TRAINING PROGRAM

## 2024-02-26 PROCEDURE — 25000003 PHARM REV CODE 250

## 2024-02-26 PROCEDURE — 25000003 PHARM REV CODE 250: Performed by: STUDENT IN AN ORGANIZED HEALTH CARE EDUCATION/TRAINING PROGRAM

## 2024-02-26 PROCEDURE — 63600175 PHARM REV CODE 636 W HCPCS: Performed by: STUDENT IN AN ORGANIZED HEALTH CARE EDUCATION/TRAINING PROGRAM

## 2024-02-26 PROCEDURE — 90686 IIV4 VACC NO PRSV 0.5 ML IM: CPT | Performed by: STUDENT IN AN ORGANIZED HEALTH CARE EDUCATION/TRAINING PROGRAM

## 2024-02-26 PROCEDURE — 90471 IMMUNIZATION ADMIN: CPT | Performed by: STUDENT IN AN ORGANIZED HEALTH CARE EDUCATION/TRAINING PROGRAM

## 2024-02-26 RX ORDER — ACETAMINOPHEN 325 MG/1
650 TABLET ORAL EVERY 6 HOURS PRN
Qty: 60 TABLET | Refills: 2 | Status: SHIPPED | OUTPATIENT
Start: 2024-02-26

## 2024-02-26 RX ORDER — IBUPROFEN 800 MG/1
800 TABLET ORAL EVERY 6 HOURS PRN
Qty: 60 TABLET | Refills: 2 | Status: SHIPPED | OUTPATIENT
Start: 2024-02-26

## 2024-02-26 RX ORDER — DOCUSATE SODIUM 100 MG/1
200 CAPSULE, LIQUID FILLED ORAL 2 TIMES DAILY PRN
Qty: 60 CAPSULE | Refills: 0 | Status: SHIPPED | OUTPATIENT
Start: 2024-02-26

## 2024-02-26 RX ORDER — OXYCODONE HYDROCHLORIDE 5 MG/1
5 TABLET ORAL EVERY 4 HOURS PRN
Qty: 20 TABLET | Refills: 0 | Status: SHIPPED | OUTPATIENT
Start: 2024-02-26

## 2024-02-26 RX ORDER — FERROUS SULFATE 325(65) MG
325 TABLET, DELAYED RELEASE (ENTERIC COATED) ORAL DAILY
Qty: 60 TABLET | Refills: 2 | Status: SHIPPED | OUTPATIENT
Start: 2024-02-26

## 2024-02-26 RX ADMIN — ACETAMINOPHEN 650 MG: 325 TABLET, FILM COATED ORAL at 05:02

## 2024-02-26 RX ADMIN — DIAZEPAM 5 MG: 5 TABLET ORAL at 04:02

## 2024-02-26 RX ADMIN — DOCUSATE SODIUM 200 MG: 100 CAPSULE, LIQUID FILLED ORAL at 08:02

## 2024-02-26 RX ADMIN — DIAZEPAM 5 MG: 5 TABLET ORAL at 09:02

## 2024-02-26 RX ADMIN — DIAZEPAM 5 MG: 5 TABLET ORAL at 01:02

## 2024-02-26 RX ADMIN — Medication 1 PATCH: at 08:02

## 2024-02-26 RX ADMIN — OXYCODONE HYDROCHLORIDE 10 MG: 10 TABLET ORAL at 01:02

## 2024-02-26 RX ADMIN — IBUPROFEN 800 MG: 400 TABLET ORAL at 09:02

## 2024-02-26 RX ADMIN — PRENATAL VIT W/ FE FUMARATE-FA TAB 27-0.8 MG 1 TABLET: 27-0.8 TAB at 08:02

## 2024-02-26 RX ADMIN — OXYCODONE HYDROCHLORIDE 10 MG: 10 TABLET ORAL at 12:02

## 2024-02-26 RX ADMIN — THERA TABS 1 TABLET: TAB at 08:02

## 2024-02-26 RX ADMIN — IBUPROFEN 800 MG: 400 TABLET ORAL at 05:02

## 2024-02-26 RX ADMIN — ACETAMINOPHEN 650 MG: 325 TABLET, FILM COATED ORAL at 12:02

## 2024-02-26 RX ADMIN — OXYCODONE HYDROCHLORIDE 10 MG: 10 TABLET ORAL at 08:02

## 2024-02-26 RX ADMIN — IBUPROFEN 800 MG: 400 TABLET ORAL at 01:02

## 2024-02-26 RX ADMIN — INFLUENZA VIRUS VACCINE 0.5 ML: 15; 15; 15; 15 SUSPENSION INTRAMUSCULAR at 06:02

## 2024-02-26 RX ADMIN — FOLIC ACID 1 MG: 1 TABLET ORAL at 08:02

## 2024-02-26 RX ADMIN — Medication 100 MG: at 08:02

## 2024-02-26 RX ADMIN — OXYCODONE HYDROCHLORIDE 10 MG: 10 TABLET ORAL at 04:02

## 2024-02-26 RX ADMIN — FERROUS SULFATE TAB 325 MG (65 MG ELEMENTAL FE) 1 EACH: 325 (65 FE) TAB at 08:02

## 2024-02-26 RX ADMIN — ACETAMINOPHEN 650 MG: 325 TABLET, FILM COATED ORAL at 11:02

## 2024-02-26 NOTE — PLAN OF CARE
02/26/24 1216   OB Discharge Planning Assessment   Source of Information patient   Insurance Medicaid   Medicaid [employer]   People in Home spouse   Number people in home 3   Relationship Status Engaged   Name of Support/Comfort Primary Source Elizabeth Barnes [phone]   Highest Level of Education GED   Father's Involvement Fully Involved   Is Father signing the birth certificate Yes   Father's Address [address] [facility] [address]   Father's Job Title [employer]   Received Prenatal Care Yes  (Pt has limited prenatal care)   Transportation Anticipated family or friend will provide   Adoption Planned no   Infant Feeding Plan formula feeding   Does baby have crib or safe sleep space? Yes   Do you have a car seat? Yes   Has other essential care items? Clothing;Bottles;Diapers   Equipment Currently Used at Home none   Potential Discharge Needs None   DME Needed Upon Discharge  none   DCFS Notified   Discharge Plan A Home with family         SW consulted to see pt due to hx of alcohol use during pregnancy.  Sw reviewed pt's chart and met with pt at bedside. Pt was alert, oriented, and easily engaged. Sw introduced self and explained the role of social work and purpose of visit. Pt voiced understanding. Pt lives in [place], [place] on a houseboat with her significant other Shlomo Mckinney. Pt stated that she has a family hx of alcoholism. Pt reported she started drinking at age 17. Pt stated she drank a 1/2 gallon everyday for 2 years. At age 20, pt became sober and it remained for 7 years. Pt's mother passed away when she was 25. She stated that her mother struggled with alcoholism. Pt's father struggled with drinking as well, however, pt stated she thinks he no longer drinks. At age 31, pt began drinking again. She additionally reported that she went to [facility] for detox about 1 year ago. However, pt relapsed 1 month later. Pt reports currently only drinking 1 pint daily. SW heavily encouraged  pt to consider intensive outpatient or inpatient rehab treatment and educated pt on the impacts of alcohol withdrawal. Pt stated that she is steadily decreasing her amount of alcohol intake and is confident that she can quit drinking on her own.    Name: Mackenzie Montalvo :  1990    Patient Active Problem List   Diagnosis    Limited prenatal care    History of methamphetamine abuse    Tobacco use    Chronic hepatitis C virus infection    Pregnancy complicated by fetal skeletal dysplasia, single gestation    Alcohol use affecting pregnancy in second trimester           Address: [address] [place] [address]  Phone: [phone]  Employer: unemployed    Education: GED     FOB: Shlomo Mckinney  Address: [address] [place] [address]  Phone: [phone]  Job Title: [employer]  Education:  high school diploma      Support person(s): Elizabeth Barnes [phone]      Pediatrician for Loiza: Mitchel 1st available [facility] pediatrician      Nutrition Plan for : Formula        WIC: N/A       Essential Items for Loiza: (includes car seat, crib/bassinet/pack-n-play, clothing, bottles, diapers, etc.)  Acquired     Transportation: Significant otherShlomo

## 2024-02-26 NOTE — DISCHARGE SUMMARY
Delivery Discharge Summary  Obstetrics      Primary OB Clinician: Peri Katz MD      Admission date: 2024  Discharge date: 2024    Disposition: To home, self care    Discharge Diagnosis List:      Patient Active Problem List   Diagnosis    Limited prenatal care    History of methamphetamine abuse    Tobacco use    Chronic hepatitis C virus infection    Pregnancy complicated by fetal skeletal dysplasia, single gestation    Alcohol use affecting pregnancy in second trimester    S/P  section       Procedure:  Primary  section due to suspected fetal anomaly    Hospital Course:  Mackenzie Montalvo is a 33 y.o. now , POD #3 who was admitted on 2024 at 38w2d for further evaluation of suspected fetal anomaly. Her hospital course was complicated by acute alcohol withdrawal which precipitated spontaneous labor, and decision was made to proceed with pLTCS due to unknown fetal skeletal dysplasia. Pregnancy was complicated by complex social situation including daily EtOH use (1 pint of vodka daily), tobacco use (1 ppd), and history of methamphetamine use in her prior pregnancy, however, no use this pregnancy. She also has a history of chronic Hep C (last VL 2023 9.7 million). Patient was subsequently admitted to labor and delivery unit with signed consents.     Delivery via  was performed without complications.    Please see delivery note for further details. Her postpartum course was complicated by acute alcohol withdrawal treated by valium prn, and complex social situation requiring DCFS consult. DCFS was made aware of the patient's alcohol abuse during pregnancy, and a case was made, however, patient was not able to be seen before hospital discharge. She was cleared for discharge by bother DCFS and social work with plans for close follow up and home evaluation. On discharge day, patient's pain is controlled with oral pain medications. Pt is tolerating ambulation without  SOB or CP, and regular diet without N/V. Reports lochia is mild. Denies any HA, vision changes, F/C, LE swelling. Denies any breast pain/soreness.    Pt in stable condition and ready for discharge. She has been instructed to start and/or continue medications and follow up with her obstetrics provider as listed below.    Pertinent studies:  CBC  Recent Labs   Lab 24  0821   WBC 10.74 11.74   HGB 13.7 9.3*   HCT 39.4 27.5*   MCV 99* 103*    239      There is no immunization history for the selected administration types on file for this patient.     Delivery:    Episiotomy:     Lacerations:     Repair suture:     Repair # of packets:     Blood loss (ml):       Birth information:  YOB: 2023   Time of birth: 6:22 AM   Sex: female   Delivery type: , Low Transverse   Gestational Age: 38w2d     Measurements    Weight:   Length:          Delivery Clinician: Delivery Providers    Delivering clinician: Jeansonne, Julie R., MD   Provider Role    Marzena Crowley MD Resident    Betsy Bro MD Resident    Maude Hahn RN Circulator    Lucy Mahajan RN Circulator    Lucia Rodriguez Surgical Tech    Jovana Duarte MD Anesthesiologist    Matthieu Rae MD Anesthesiologist             Additional  information:  Forceps:    Vacuum:    Breech:    Observed anomalies      Living?:     Apgars    Living status: Living  Apgar Component Scores:  1 min.:  5 min.:  10 min.:  15 min.:  20 min.:    Skin color:         Heart rate:         Reflex irritability:         Muscle tone:         Respiratory effort:         Total:         Apgars assigned by: NICU         Placenta: Delivered:       appearance    Patient Instructions:   Current Discharge Medication List        START taking these medications    Details   acetaminophen (TYLENOL) 325 MG tablet Take 2 tablets (650 mg total) by mouth every 6 (six) hours as needed for Pain. Alternate between ibuprofen and tylenol every 3  hours. For example: @0800: ibuprofen 600mg @1100: tylenol 650mg @1400: ibuprofen 600mg @1700: tylenol 650 mg @2000: ibuprofen 600mg  Qty: 60 tablet, Refills: 2      docusate sodium (COLACE) 100 MG capsule Take 2 capsules (200 mg total) by mouth 2 (two) times daily as needed for Constipation.  Qty: 60 capsule, Refills: 0      ferrous sulfate 325 (65 FE) MG EC tablet Take 1 tablet (325 mg total) by mouth once daily.  Qty: 60 tablet, Refills: 2      ibuprofen (ADVIL,MOTRIN) 800 MG tablet Take 1 tablet (800 mg total) by mouth every 6 (six) hours as needed for Other. Alternate between ibuprofen and tylenol every 3 hours. For example: @0800: ibuprofen 600mg @1100: tylenol 650mg @1400: ibuprofen 600mg @1700: tylenol 650 mg @2000: ibuprofen 600mg  Qty: 60 tablet, Refills: 2           CONTINUE these medications which have NOT CHANGED    Details   PNV,calcium 72/iron/folic acid (PRENATAL VITAMIN) Tab Take 1 tablet by mouth once daily.  Qty: 30 tablet, Refills: 11    Associated Diagnoses: Pregnancy confirmed by positive urine test           STOP taking these medications       ondansetron (ZOFRAN-ODT) 4 MG TbDL Comments:   Reason for Stopping:               Discharge Procedure Orders   Diet Adult Regular     Lifting restrictions   Order Comments: No lifting more than infant for six weeks.     Other restrictions (specify):     No driving until:   Order Comments: Comfortable stepping on gas and brakes     Pelvic Rest   Order Comments: Nothing in vagina, including intercourse, for at least six weeks     Notify your health care provider if you experience any of the following:  temperature >100.4     Notify your health care provider if you experience any of the following:  persistent nausea and vomiting or diarrhea     Notify your health care provider if you experience any of the following:  severe uncontrolled pain     Notify your health care provider if you experience any of the following:  redness, tenderness, or signs of  infection (pain, swelling, redness, odor or green/yellow discharge around incision site)     Notify your health care provider if you experience any of the following:  severe persistent headache     Notify your health care provider if you experience any of the following:  persistent dizziness, light-headedness, or visual disturbances     Notify your health care provider if you experience any of the following:  increased confusion or weakness     Notify your health care provider if you experience any of the following:   Order Comments: Heavy vaginal bleeding, saturating more than two pads in one hour     Reason for not Ordering Smoking Cessation Referral     Order Specific Question Answer Comments   Reason for not ordering: Patient refused      Reason for not Prescribing Nicotine Replacement     Order Specific Question Answer Comments   Reason for not Prescribing: Patient refused      Activity as tolerated        Follow-up Information       Lyndsey Greenwood MD. Go in 2 week(s).    Specialty: Obstetrics and Gynecology  Why: for incision check  Contact information:  [address]  [address] [place] [id number] [phone]                              Betsy Bro MD  OB/GYN PGY-2

## 2024-02-26 NOTE — PROGRESS NOTES
Legally case management has met their obligations and contacted [facility]. [facility] and aware of mom's positive alcohol screen. [facility] has cleared this pt for discharge and will do further investigation inside of pt's home.

## 2024-02-26 NOTE — PLAN OF CARE
VSS. Pain controlled with scheduled and prn oral pain medication. CIWA scores q4h. Formula feeding without RN assistance. Fundus firm and midline with light lochia rubra upon assessment. LTV dressing in place, dry/intact with a small amount of brown, dried drainage, no odor. Voiding spontaneously without difficulty and with adequate output.Passing gas. No concerns at this time. Will continue to monitor and intervene as necessary.

## 2024-02-26 NOTE — PROGRESS NOTES
DCFS substance exposed  report made over the phone with Anna Tinsley @ 11:00AM. Phone number is [phone]. Case number is [id number].

## 2024-02-26 NOTE — CARE UPDATE
Resident to bedside with RN after report of patient feeling agitated. Discussed that infant will need to remain inpatient for another day as DCFS is unable to visit today but may be able to meet tomorrow. Reiterated that patient is not required to stay for the meeting, however, patient reported that she has no plans to leave hospital without her infant.     Patient reported strong desire to smoke despite use of nicotine patch. Discussed that [facility] is a non-smoking facility, however, if patient desired to go outdoors there is an outdoor area for patients on the second floor. Reinforced that patient will not be able to smoke at that area. Patient verbalized understanding, all questions answered.

## 2024-02-26 NOTE — PROGRESS NOTES
[facility] notified SW intern over the phone that DCFS will most likely not see pt while in hospital due to  not showing current active withdrawal symptoms from alcohol. [facility] notified SW intern to contact  once meconium results are received.

## 2024-02-26 NOTE — PROGRESS NOTES
POSTPARTUM PROGRESS NOTE    Subjective:     PPD/POD#: 3   Procedure: Primary LTCS   EGA: 38w2d   N/V: No   F/C: No   Abd Pain: Mild, well-controlled with oral pain medication   Lochia: Mild   Voiding: Yes   Ambulating: Yes   Bowel fnc: Yes   Contraception: Immediate post-placental Liletta IUD placed. Patient no longer desires tubal ligation     Objective:      Temp:  [97.5 °F (36.4 °C)-98.6 °F (37 °C)] 98.1 °F (36.7 °C)  Pulse:  [] 107  Resp:  [17-18] 18  SpO2:  [96 %-100 %] 98 %  BP: (124-152)/(71-94) 134/76    Abdomen: Soft, appropriately tender   Uterus: Firm, no fundal tenderness   Incision: Bandage in place without shadowing     Lab Review    Recent Labs   Lab 02/22/24 1957 02/24/24  0615    134*   K 3.7 4.4    103   CO2 22* 22*   BUN 17 27*   CREATININE 0.7 0.9   GLU 82 70   PROT 7.2 5.6*   BILITOT 0.4 0.4   ALKPHOS 168* 124   ALT 26 20   AST 41* 29   MG  --  1.5*   PHOS  --  4.0       Recent Labs   Lab 02/22/24 1957 02/24/24  0821   WBC 10.74 11.74   HGB 13.7 9.3*   HCT 39.4 27.5*   MCV 99* 103*    239     I/O  No intake or output data in the 24 hours ending 02/26/24 0625    Assessment and Plan:   Postpartum care:  - Patient doing well  - Continue routine management and advances  - Continue scheduled and PRN analgesia    Limited PNC  - SW to see Monday.  Discussed over the weekend with on-call SW; pt needs to been see by DCFS on Monday for home evaluation  - Will touch base with DCFS this morning     Alcoholism  - Patient reports drinking 2 pints of alcohol daily  - CIWA protocol; scores < 3 throughout yesterday  - Received Diazepam 3x over last 24 hours    Tobacco use  - Reports smoking 1 ppd  - Nicotine patches PRN    H/O Meth Use  - Denies any history this pregnancy. Denies any other illicit substance use this pregnancy including IV drugs.      gHTN  - BP as above  - asymptomatic  - preE labs as above  - UOP: subjectively appropriate  - Mag: not indicated  - Hypertensive agent:  none    Mary Cope MD PGY-2  Obstetrics and Gynecology  [facility]

## 2024-02-26 NOTE — NURSING
Discharge education done. Reviewed mother baby care guide and warning signs for mom and baby. Reviewed formula feeding guide for non-nursing engorgement and resources for formula feeding. Pt verbalized understanding and questions answered.

## 2024-02-27 ENCOUNTER — PATIENT MESSAGE (OUTPATIENT)
Dept: OBSTETRICS AND GYNECOLOGY | Facility: OTHER | Age: 34
End: 2024-02-27
Payer: MEDICAID

## 2024-02-27 LAB
FINAL PATHOLOGIC DIAGNOSIS: NORMAL
GROSS: NORMAL
Lab: NORMAL

## 2024-02-27 NOTE — PROGRESS NOTES
Progress note completed by MARC Cerda student intern with [facility] Master of Social Work program.  Information discussed and reviewed with Janeen Negrete LCSW. Appropriate information and documentation.

## 2024-02-27 NOTE — PROGRESS NOTES
Progress note completed by MARC Cerda student intern with [facility] Master of Social Work program.  Information discussed and reviewed with Janeen Negrete LCSW. Appropriate information and documentation.

## 2024-02-27 NOTE — PROGRESS NOTES
Progress note completed by MARC Cerda student intern with [facility] Master of Social Work program.  Information discussed and reviewed with Janeen Negrete LCSW. Appropriate information and documentation.

## 2024-02-27 NOTE — PROGRESS NOTES
Plan of care note completed by MARC Cerda student intern with [facility] Master of Social Work program.  Information discussed and reviewed with Janeen Negrete LCSW. Appropriate information and documentation.

## 2024-09-05 DIAGNOSIS — Z84.81 FAMILY HISTORY OF GENETIC DISEASE CARRIER: Primary | ICD-10-CM

## 2024-12-23 NOTE — ANESTHESIA PREPROCEDURE EVALUATION
Keep appointment with dermatology as planned.    [facility]  Anesthesia Pre-Operative Evaluation         Patient Name: Mackenzie Montalvo  YOB: 1990  MRN: [id number]    2024      Mackenzie Montalvo is a 33 y.o. female  @ 38w1d who presents for observation. IUP c/b fetal skeletal dysplasia concerning for OI type 3, EtOH use, HCV, tobacco use, limited prenatal care, and hx of meth abuse. Denies asthma, spinal procedures/abnormalities, hypertension, or coagulopathies.       OB History    Para Term  AB Living   2 1 1     1   SAB IAB Ectopic Multiple Live Births           1      # Outcome Date GA Lbr Alan/2nd Weight Sex Delivery Anes PTL Lv   2 Current            1 Term 21 42w0d  3.345 kg (7 lb 6 oz) F Vag-Spont EPI N GARETH       Review of patient's allergies indicates:  No Known Allergies    Wt Readings from Last 1 Encounters:   24 0500 76.2 kg (167 lb 15.9 oz)       BP Readings from Last 3 Encounters:   24 124/75   24 128/67   23 128/85       Patient Active Problem List   Diagnosis    Limited prenatal care    History of methamphetamine abuse    Tobacco use    Chronic hepatitis C virus infection    Pregnancy complicated by fetal skeletal dysplasia, single gestation    Alcohol use affecting pregnancy in second trimester       No past surgical history on file.    Social History     Socioeconomic History    Marital status: Single   Tobacco Use    Smoking status: Every Day     Current packs/day: 1.00     Average packs/day: 1 pack/day for 17.1 years (17.1 ttl pk-yrs)     Types: Cigarettes     Start date:     Smokeless tobacco: Never   Substance and Sexual Activity    Alcohol use: Yes     Alcohol/week: 112.0 standard drinks of alcohol     Types: 112 Shots of liquor per week    Drug use: Not Currently     Types: IV, Methamphetamines    Sexual activity: Yes     Partners: Male     Birth control/protection: None         Chemistry        Component Value Date/Time     20247    K  "3.7 02/22/2024 1957     02/22/2024 1957    CO2 22 (L) 02/22/2024 1957    BUN 17 02/22/2024 1957    CREATININE 0.7 02/22/2024 1957    GLU 82 02/22/2024 1957        Component Value Date/Time    CALCIUM 9.7 02/22/2024 1957    ALKPHOS 168 (H) 02/22/2024 1957    AST 41 (H) 02/22/2024 1957    ALT 26 02/22/2024 1957    BILITOT 0.4 02/22/2024 1957    ESTGFRAFRICA 103 08/26/2021 0241    ESTGFRAFRICA >60 07/12/2020 1639    EGFRNONAA >60 07/12/2020 1639            Lab Results   Component Value Date    WBC 10.74 02/22/2024    HGB 13.7 02/22/2024    HCT 39.4 02/22/2024    MCV 99 (H) 02/22/2024     02/22/2024       No results for input(s): "PT", "INR", "PROTIME", "APTT" in the last 72 hours.                                                                                                                   02/23/2024  Mackenzie Montalvo is a 33 y.o., female.      Pre-op Assessment    I have reviewed the Patient Summary Reports.     I have reviewed the Nursing Notes. I have reviewed the NPO Status.   I have reviewed the Medications.     Review of Systems  Anesthesia Hx:             Denies Family Hx of Anesthesia complications.    Denies Personal Hx of Anesthesia complications.                    Social:  Smoker, Alcohol Use, Recreational Drugs       Hepatic/GI:      Liver Disease, Hepatitis, C        Liver Disease, Hepatitis            Physical Exam  General: Well nourished, Cooperative, Alert and Oriented    Airway:  Mallampati: II / I  Mouth Opening: Normal  TM Distance: Normal  Tongue: Normal  Neck ROM: Normal ROM    Dental:  Intact        Anesthesia Plan  Type of Anesthesia, risks & benefits discussed:    Anesthesia Type: Epidural, Gen ETT, MAC, Spinal, CSE  Intra-op Monitoring Plan: Standard ASA Monitors  Post Op Pain Control Plan: epidural analgesia and multimodal analgesia  Induction:  IV  Airway Plan: Video, Post-Induction  Informed Consent: Informed consent signed with the Patient and all parties understand the " risks and agree with anesthesia plan.  All questions answered.   ASA Score: 2  Day of Surgery Review of History & Physical: H&P Update referred to the surgeon/provider.    Ready For Surgery From Anesthesia Perspective.     .

## 2025-06-02 ENCOUNTER — HOSPITAL ENCOUNTER (EMERGENCY)
Facility: HOSPITAL | Age: 35
Discharge: HOME | End: 2025-06-02
Attending: EMERGENCY MEDICINE

## 2025-06-02 VITALS
SYSTOLIC BLOOD PRESSURE: 129 MMHG | TEMPERATURE: 100 F | RESPIRATION RATE: 17 BRPM | HEART RATE: 99 BPM | DIASTOLIC BLOOD PRESSURE: 81 MMHG | OXYGEN SATURATION: 98 %

## 2025-06-02 DIAGNOSIS — J06.9 URI WITH COUGH AND CONGESTION: Primary | ICD-10-CM

## 2025-06-02 DIAGNOSIS — R05.9 COUGH: ICD-10-CM

## 2025-06-02 DIAGNOSIS — J98.4 PNEUMONITIS: ICD-10-CM

## 2025-06-02 LAB
B-HCG UR QL: NEGATIVE
CTP QC/QA: YES
CTP QC/QA: YES
INFLUENZA A ANTIGEN, POC: NEGATIVE
INFLUENZA B ANTIGEN, POC: NEGATIVE
POC RAPID STREP A: NEGATIVE
SARS-COV-2 RDRP RESP QL NAA+PROBE: NEGATIVE

## 2025-06-02 PROCEDURE — 87804 INFLUENZA ASSAY W/OPTIC: CPT | Mod: 59,ER

## 2025-06-02 PROCEDURE — 81025 URINE PREGNANCY TEST: CPT | Mod: ER | Performed by: EMERGENCY MEDICINE

## 2025-06-02 PROCEDURE — 99284 EMERGENCY DEPT VISIT MOD MDM: CPT | Mod: 25,ER

## 2025-06-02 PROCEDURE — 25000003 PHARM REV CODE 250: Mod: ER | Performed by: NURSE PRACTITIONER

## 2025-06-02 PROCEDURE — 81025 URINE PREGNANCY TEST: CPT | Mod: ER

## 2025-06-02 PROCEDURE — 87635 SARS-COV-2 COVID-19 AMP PRB: CPT | Mod: ER | Performed by: EMERGENCY MEDICINE

## 2025-06-02 PROCEDURE — 87880 STREP A ASSAY W/OPTIC: CPT | Mod: ER

## 2025-06-02 RX ORDER — ALBUTEROL SULFATE 90 UG/1
1-2 INHALANT RESPIRATORY (INHALATION) EVERY 6 HOURS PRN
Qty: 6.7 G | Refills: 0 | Status: SHIPPED | OUTPATIENT
Start: 2025-06-02 | End: 2025-07-02

## 2025-06-02 RX ORDER — PREDNISONE 20 MG/1
40 TABLET ORAL DAILY
Qty: 10 TABLET | Refills: 0 | Status: SHIPPED | OUTPATIENT
Start: 2025-06-02 | End: 2025-06-07

## 2025-06-02 RX ORDER — DOXYCYCLINE 100 MG/1
100 CAPSULE ORAL 2 TIMES DAILY
Qty: 10 CAPSULE | Refills: 0 | Status: SHIPPED | OUTPATIENT
Start: 2025-06-02 | End: 2025-06-07

## 2025-06-02 RX ORDER — ACETAMINOPHEN 325 MG/1
650 TABLET ORAL
Status: COMPLETED | OUTPATIENT
Start: 2025-06-02 | End: 2025-06-02

## 2025-06-02 RX ADMIN — ACETAMINOPHEN 650 MG: 325 TABLET ORAL at 01:06
